# Patient Record
Sex: MALE | Race: WHITE | Employment: UNEMPLOYED | URBAN - METROPOLITAN AREA
[De-identification: names, ages, dates, MRNs, and addresses within clinical notes are randomized per-mention and may not be internally consistent; named-entity substitution may affect disease eponyms.]

---

## 2021-03-04 ENCOUNTER — APPOINTMENT (OUTPATIENT)
Dept: GENERAL RADIOLOGY | Age: 61
DRG: 190 | End: 2021-03-04
Payer: COMMERCIAL

## 2021-03-04 ENCOUNTER — HOSPITAL ENCOUNTER (INPATIENT)
Age: 61
LOS: 1 days | Discharge: ANOTHER HOSPITAL | DRG: 190 | End: 2021-03-05
Attending: EMERGENCY MEDICINE | Admitting: INTERNAL MEDICINE
Payer: COMMERCIAL

## 2021-03-04 DIAGNOSIS — I21.3 ST ELEVATION MYOCARDIAL INFARCTION (STEMI), UNSPECIFIED ARTERY (HCC): Primary | ICD-10-CM

## 2021-03-04 PROBLEM — I50.21 ACUTE SYSTOLIC CHF (CONGESTIVE HEART FAILURE), NYHA CLASS 4 (HCC): Status: ACTIVE | Noted: 2021-03-04

## 2021-03-04 PROBLEM — I50.23 ACUTE ON CHRONIC SYSTOLIC CHF (CONGESTIVE HEART FAILURE), NYHA CLASS 4 (HCC): Status: ACTIVE | Noted: 2021-03-04

## 2021-03-04 LAB
A/G RATIO: 1.3 (ref 1.1–2.2)
ALBUMIN SERPL-MCNC: 4.7 G/DL (ref 3.4–5)
ALP BLD-CCNC: 106 U/L (ref 40–129)
ALT SERPL-CCNC: 28 U/L (ref 10–40)
ANION GAP SERPL CALCULATED.3IONS-SCNC: 16 MMOL/L (ref 3–16)
APTT: 93.5 SEC (ref 24.2–36.2)
AST SERPL-CCNC: 38 U/L (ref 15–37)
BASE EXCESS ARTERIAL: -4 (ref -3–3)
BASE EXCESS VENOUS: -9.4 MMOL/L (ref -3–3)
BASOPHILS ABSOLUTE: 0.2 K/UL (ref 0–0.2)
BASOPHILS RELATIVE PERCENT: 1 %
BILIRUB SERPL-MCNC: 0.9 MG/DL (ref 0–1)
BILIRUBIN URINE: NEGATIVE
BLOOD, URINE: NEGATIVE
BUN BLDV-MCNC: 10 MG/DL (ref 7–20)
CALCIUM SERPL-MCNC: 9.2 MG/DL (ref 8.3–10.6)
CARBOXYHEMOGLOBIN: 2.1 % (ref 0–1.5)
CHLORIDE BLD-SCNC: 96 MMOL/L (ref 99–110)
CLARITY: CLEAR
CO2: 19 MMOL/L (ref 21–32)
COLOR: YELLOW
CREAT SERPL-MCNC: 1.1 MG/DL (ref 0.8–1.3)
D DIMER: 221 NG/ML DDU (ref 0–229)
EOSINOPHILS ABSOLUTE: 0.6 K/UL (ref 0–0.6)
EOSINOPHILS RELATIVE PERCENT: 4 %
EPITHELIAL CELLS, UA: 1 /HPF (ref 0–5)
GFR AFRICAN AMERICAN: >60
GFR NON-AFRICAN AMERICAN: >60
GLOBULIN: 3.6 G/DL
GLUCOSE BLD-MCNC: 157 MG/DL (ref 70–99)
GLUCOSE BLD-MCNC: 211 MG/DL (ref 70–99)
GLUCOSE URINE: NEGATIVE MG/DL
HCO3 ARTERIAL: 27.7 MMOL/L (ref 21–29)
HCO3 VENOUS: 22.7 MMOL/L (ref 23–29)
HCT VFR BLD CALC: 48.6 % (ref 40.5–52.5)
HCT VFR BLD CALC: 49.7 % (ref 40.5–52.5)
HEMOGLOBIN, VEN, REDUCED: 13 %
HEMOGLOBIN: 16 G/DL (ref 13.5–17.5)
HEMOGLOBIN: 16.7 G/DL (ref 13.5–17.5)
HEMOGLOBIN: 18 GM/DL (ref 13.5–17.5)
HYALINE CASTS: 4 /LPF (ref 0–8)
INR BLD: 1.07 (ref 0.86–1.14)
KETONES, URINE: NEGATIVE MG/DL
LACTIC ACID: 4.6 MMOL/L (ref 0.4–2)
LEFT VENTRICULAR EJECTION FRACTION MODE: NORMAL
LEUKOCYTE ESTERASE, URINE: ABNORMAL
LV EF: 30 %
LYMPHOCYTES ABSOLUTE: 5.6 K/UL (ref 1–5.1)
LYMPHOCYTES RELATIVE PERCENT: 36 %
MACROCYTES: ABNORMAL
MCH RBC QN AUTO: 32.9 PG (ref 26–34)
MCH RBC QN AUTO: 33.6 PG (ref 26–34)
MCHC RBC AUTO-ENTMCNC: 32.9 G/DL (ref 31–36)
MCHC RBC AUTO-ENTMCNC: 33.5 G/DL (ref 31–36)
MCV RBC AUTO: 100 FL (ref 80–100)
MCV RBC AUTO: 100.2 FL (ref 80–100)
METHEMOGLOBIN VENOUS: 0.2 %
MICROSCOPIC EXAMINATION: YES
MONOCYTES ABSOLUTE: 1.6 K/UL (ref 0–1.3)
MONOCYTES RELATIVE PERCENT: 10 %
NEUTROPHILS ABSOLUTE: 7.6 K/UL (ref 1.7–7.7)
NEUTROPHILS RELATIVE PERCENT: 49 %
NITRITE, URINE: NEGATIVE
O2 CONTENT, VEN: 21 VOL %
O2 SAT, ARTERIAL: 96 % (ref 93–100)
O2 SAT, VEN: 87 %
O2 THERAPY: ABNORMAL
PCO2 ARTERIAL: 102.8 MM HG (ref 35–45)
PCO2, VEN: 76.1 MMHG (ref 40–50)
PDW BLD-RTO: 13.6 % (ref 12.4–15.4)
PDW BLD-RTO: 13.8 % (ref 12.4–15.4)
PERFORMED ON: ABNORMAL
PH ARTERIAL: 7.03 (ref 7.35–7.45)
PH UA: 5 (ref 5–8)
PH VENOUS: 7.08 (ref 7.35–7.45)
PLATELET # BLD: 225 K/UL (ref 135–450)
PLATELET # BLD: 225 K/UL (ref 135–450)
PLATELET SLIDE REVIEW: ADEQUATE
PMV BLD AUTO: 8.5 FL (ref 5–10.5)
PMV BLD AUTO: 8.5 FL (ref 5–10.5)
PO2 ARTERIAL: 111.3 MM HG (ref 75–108)
PO2, VEN: 72.3 MMHG (ref 25–40)
POC ACT LR: 261 SEC
POC HEMATOCRIT: 53 % (ref 40.5–52.5)
POC PATIENT TEMP: 95
POC SAMPLE TYPE: ABNORMAL
POLYCHROMASIA: ABNORMAL
POTASSIUM REFLEX MAGNESIUM: 3.7 MMOL/L (ref 3.5–5.1)
PRO-BNP: 3506 PG/ML (ref 0–124)
PROTEIN UA: 30 MG/DL
PROTHROMBIN TIME: 12.4 SEC (ref 10–13.2)
RBC # BLD: 4.86 M/UL (ref 4.2–5.9)
RBC # BLD: 4.96 M/UL (ref 4.2–5.9)
RBC UA: 1 /HPF (ref 0–4)
SLIDE REVIEW: ABNORMAL
SODIUM BLD-SCNC: 131 MMOL/L (ref 136–145)
SPECIFIC GRAVITY UA: 1.01 (ref 1–1.03)
TCO2 ARTERIAL: 31 MMOL/L
TCO2 CALC VENOUS: 56 MMOL/L
TOTAL PROTEIN: 8.3 G/DL (ref 6.4–8.2)
TROPONIN: 0.05 NG/ML
URINE REFLEX TO CULTURE: ABNORMAL
URINE TYPE: ABNORMAL
UROBILINOGEN, URINE: 0.2 E.U./DL
WBC # BLD: 15.6 K/UL (ref 4–11)
WBC # BLD: 21.2 K/UL (ref 4–11)
WBC UA: 4 /HPF (ref 0–5)

## 2021-03-04 PROCEDURE — C1887 CATHETER, GUIDING: HCPCS

## 2021-03-04 PROCEDURE — 94002 VENT MGMT INPAT INIT DAY: CPT

## 2021-03-04 PROCEDURE — 6360000002 HC RX W HCPCS: Performed by: EMERGENCY MEDICINE

## 2021-03-04 PROCEDURE — C1769 GUIDE WIRE: HCPCS

## 2021-03-04 PROCEDURE — 96374 THER/PROPH/DIAG INJ IV PUSH: CPT

## 2021-03-04 PROCEDURE — 2500000003 HC RX 250 WO HCPCS

## 2021-03-04 PROCEDURE — 99152 MOD SED SAME PHYS/QHP 5/>YRS: CPT | Performed by: INTERNAL MEDICINE

## 2021-03-04 PROCEDURE — 85025 COMPLETE CBC W/AUTO DIFF WBC: CPT

## 2021-03-04 PROCEDURE — 36415 COLL VENOUS BLD VENIPUNCTURE: CPT

## 2021-03-04 PROCEDURE — 83605 ASSAY OF LACTIC ACID: CPT

## 2021-03-04 PROCEDURE — 85014 HEMATOCRIT: CPT

## 2021-03-04 PROCEDURE — 0BH17EZ INSERTION OF ENDOTRACHEAL AIRWAY INTO TRACHEA, VIA NATURAL OR ARTIFICIAL OPENING: ICD-10-PCS | Performed by: INTERNAL MEDICINE

## 2021-03-04 PROCEDURE — 85347 COAGULATION TIME ACTIVATED: CPT

## 2021-03-04 PROCEDURE — 80053 COMPREHEN METABOLIC PANEL: CPT

## 2021-03-04 PROCEDURE — 84484 ASSAY OF TROPONIN QUANT: CPT

## 2021-03-04 PROCEDURE — 2580000003 HC RX 258

## 2021-03-04 PROCEDURE — 93005 ELECTROCARDIOGRAM TRACING: CPT | Performed by: INTERNAL MEDICINE

## 2021-03-04 PROCEDURE — 93458 L HRT ARTERY/VENTRICLE ANGIO: CPT

## 2021-03-04 PROCEDURE — 5A1935Z RESPIRATORY VENTILATION, LESS THAN 24 CONSECUTIVE HOURS: ICD-10-PCS | Performed by: INTERNAL MEDICINE

## 2021-03-04 PROCEDURE — 93458 L HRT ARTERY/VENTRICLE ANGIO: CPT | Performed by: INTERNAL MEDICINE

## 2021-03-04 PROCEDURE — B2111ZZ FLUOROSCOPY OF MULTIPLE CORONARY ARTERIES USING LOW OSMOLAR CONTRAST: ICD-10-PCS | Performed by: INTERNAL MEDICINE

## 2021-03-04 PROCEDURE — 82803 BLOOD GASES ANY COMBINATION: CPT

## 2021-03-04 PROCEDURE — 85730 THROMBOPLASTIN TIME PARTIAL: CPT

## 2021-03-04 PROCEDURE — 85379 FIBRIN DEGRADATION QUANT: CPT

## 2021-03-04 PROCEDURE — 6370000000 HC RX 637 (ALT 250 FOR IP): Performed by: EMERGENCY MEDICINE

## 2021-03-04 PROCEDURE — 6360000002 HC RX W HCPCS

## 2021-03-04 PROCEDURE — 6360000002 HC RX W HCPCS: Performed by: INTERNAL MEDICINE

## 2021-03-04 PROCEDURE — 93005 ELECTROCARDIOGRAM TRACING: CPT | Performed by: EMERGENCY MEDICINE

## 2021-03-04 PROCEDURE — 2709999900 HC NON-CHARGEABLE SUPPLY

## 2021-03-04 PROCEDURE — 86900 BLOOD TYPING SEROLOGIC ABO: CPT

## 2021-03-04 PROCEDURE — 83880 ASSAY OF NATRIURETIC PEPTIDE: CPT

## 2021-03-04 PROCEDURE — 71045 X-RAY EXAM CHEST 1 VIEW: CPT

## 2021-03-04 PROCEDURE — 85027 COMPLETE CBC AUTOMATED: CPT

## 2021-03-04 PROCEDURE — 94660 CPAP INITIATION&MGMT: CPT

## 2021-03-04 PROCEDURE — 2700000000 HC OXYGEN THERAPY PER DAY

## 2021-03-04 PROCEDURE — 94761 N-INVAS EAR/PLS OXIMETRY MLT: CPT

## 2021-03-04 PROCEDURE — 99254 IP/OBS CNSLTJ NEW/EST MOD 60: CPT | Performed by: INTERNAL MEDICINE

## 2021-03-04 PROCEDURE — 86901 BLOOD TYPING SEROLOGIC RH(D): CPT

## 2021-03-04 PROCEDURE — 82947 ASSAY GLUCOSE BLOOD QUANT: CPT

## 2021-03-04 PROCEDURE — 81001 URINALYSIS AUTO W/SCOPE: CPT

## 2021-03-04 PROCEDURE — 6360000004 HC RX CONTRAST MEDICATION: Performed by: INTERNAL MEDICINE

## 2021-03-04 PROCEDURE — 99153 MOD SED SAME PHYS/QHP EA: CPT

## 2021-03-04 PROCEDURE — 2000000000 HC ICU R&B

## 2021-03-04 PROCEDURE — B2151ZZ FLUOROSCOPY OF LEFT HEART USING LOW OSMOLAR CONTRAST: ICD-10-PCS | Performed by: INTERNAL MEDICINE

## 2021-03-04 PROCEDURE — 99284 EMERGENCY DEPT VISIT MOD MDM: CPT

## 2021-03-04 PROCEDURE — 99152 MOD SED SAME PHYS/QHP 5/>YRS: CPT

## 2021-03-04 PROCEDURE — C1894 INTRO/SHEATH, NON-LASER: HCPCS

## 2021-03-04 PROCEDURE — 2580000003 HC RX 258: Performed by: INTERNAL MEDICINE

## 2021-03-04 PROCEDURE — 4A023N7 MEASUREMENT OF CARDIAC SAMPLING AND PRESSURE, LEFT HEART, PERCUTANEOUS APPROACH: ICD-10-PCS | Performed by: INTERNAL MEDICINE

## 2021-03-04 PROCEDURE — 85610 PROTHROMBIN TIME: CPT

## 2021-03-04 PROCEDURE — 86850 RBC ANTIBODY SCREEN: CPT

## 2021-03-04 RX ORDER — HEPARIN SODIUM 1000 [USP'U]/ML
24.5 INJECTION, SOLUTION INTRAVENOUS; SUBCUTANEOUS PRN
Status: DISCONTINUED | OUTPATIENT
Start: 2021-03-04 | End: 2021-03-05 | Stop reason: HOSPADM

## 2021-03-04 RX ORDER — FUROSEMIDE 10 MG/ML
80 INJECTION INTRAMUSCULAR; INTRAVENOUS ONCE
Status: COMPLETED | OUTPATIENT
Start: 2021-03-04 | End: 2021-03-04

## 2021-03-04 RX ORDER — PROPOFOL 10 MG/ML
10 INJECTION, EMULSION INTRAVENOUS
Status: DISCONTINUED | OUTPATIENT
Start: 2021-03-04 | End: 2021-03-05 | Stop reason: HOSPADM

## 2021-03-04 RX ORDER — ASPIRIN 81 MG/1
324 TABLET, CHEWABLE ORAL ONCE
Status: COMPLETED | OUTPATIENT
Start: 2021-03-04 | End: 2021-03-04

## 2021-03-04 RX ORDER — NITROGLYCERIN 0.4 MG/1
0.4 TABLET SUBLINGUAL EVERY 5 MIN PRN
Status: DISCONTINUED | OUTPATIENT
Start: 2021-03-04 | End: 2021-03-04

## 2021-03-04 RX ORDER — HEPARIN SODIUM 10000 [USP'U]/100ML
INJECTION, SOLUTION INTRAVENOUS
Status: COMPLETED
Start: 2021-03-04 | End: 2021-03-04

## 2021-03-04 RX ORDER — HEPARIN SODIUM 10000 [USP'U]/100ML
12 INJECTION, SOLUTION INTRAVENOUS CONTINUOUS
Status: DISCONTINUED | OUTPATIENT
Start: 2021-03-04 | End: 2021-03-05 | Stop reason: HOSPADM

## 2021-03-04 RX ORDER — HEPARIN SODIUM 1000 [USP'U]/ML
49 INJECTION, SOLUTION INTRAVENOUS; SUBCUTANEOUS ONCE
Status: COMPLETED | OUTPATIENT
Start: 2021-03-04 | End: 2021-03-04

## 2021-03-04 RX ORDER — HEPARIN SODIUM 1000 [USP'U]/ML
49 INJECTION, SOLUTION INTRAVENOUS; SUBCUTANEOUS PRN
Status: DISCONTINUED | OUTPATIENT
Start: 2021-03-04 | End: 2021-03-05 | Stop reason: HOSPADM

## 2021-03-04 RX ORDER — PROPOFOL 10 MG/ML
INJECTION, EMULSION INTRAVENOUS
Status: DISPENSED
Start: 2021-03-04 | End: 2021-03-05

## 2021-03-04 RX ORDER — SODIUM CHLORIDE 9 MG/ML
INJECTION, SOLUTION INTRAVENOUS
Status: DISPENSED
Start: 2021-03-04 | End: 2021-03-05

## 2021-03-04 RX ADMIN — FUROSEMIDE 80 MG: 10 INJECTION, SOLUTION INTRAMUSCULAR; INTRAVENOUS at 22:45

## 2021-03-04 RX ADMIN — HEPARIN SODIUM 4000 UNITS: 1000 INJECTION, SOLUTION INTRAVENOUS; SUBCUTANEOUS at 20:27

## 2021-03-04 RX ADMIN — ASPIRIN 81 MG 324 MG: 81 TABLET ORAL at 20:09

## 2021-03-04 RX ADMIN — TICAGRELOR 180 MG: 90 TABLET ORAL at 20:28

## 2021-03-04 RX ADMIN — HEPARIN SODIUM 12 UNITS/KG/HR: 10000 INJECTION, SOLUTION INTRAVENOUS at 23:02

## 2021-03-04 RX ADMIN — IOPAMIDOL 87 ML: 755 INJECTION, SOLUTION INTRAVENOUS at 21:32

## 2021-03-04 RX ADMIN — PROPOFOL 30 MCG/KG/MIN: 10 INJECTION, EMULSION INTRAVENOUS at 23:10

## 2021-03-04 RX ADMIN — HEPARIN SODIUM 12 UNITS/KG/HR: 10000 INJECTION, SOLUTION INTRAVENOUS at 20:23

## 2021-03-04 RX ADMIN — FUROSEMIDE 10 MG/HR: 10 INJECTION, SOLUTION INTRAMUSCULAR; INTRAVENOUS at 23:02

## 2021-03-04 ASSESSMENT — PULMONARY FUNCTION TESTS
PIF_VALUE: 28
PIF_VALUE: 28

## 2021-03-05 VITALS
RESPIRATION RATE: 24 BRPM | DIASTOLIC BLOOD PRESSURE: 90 MMHG | WEIGHT: 177.25 LBS | SYSTOLIC BLOOD PRESSURE: 136 MMHG | HEIGHT: 70 IN | HEART RATE: 87 BPM | BODY MASS INDEX: 25.38 KG/M2 | TEMPERATURE: 100.8 F | OXYGEN SATURATION: 99 %

## 2021-03-05 LAB
ABO/RH: NORMAL
ANION GAP SERPL CALCULATED.3IONS-SCNC: 11 MMOL/L (ref 3–16)
ANION GAP SERPL CALCULATED.3IONS-SCNC: 12 MMOL/L (ref 3–16)
ANION GAP SERPL CALCULATED.3IONS-SCNC: 13 MMOL/L (ref 3–16)
ANTIBODY SCREEN: NORMAL
APTT: 53.7 SEC (ref 24.2–36.2)
APTT: 59.5 SEC (ref 24.2–36.2)
BASE EXCESS ARTERIAL: -3 (ref -3–3)
BASE EXCESS ARTERIAL: -6.3 MMOL/L (ref -3–3)
BUN BLDV-MCNC: 13 MG/DL (ref 7–20)
BUN BLDV-MCNC: 17 MG/DL (ref 7–20)
BUN BLDV-MCNC: 18 MG/DL (ref 7–20)
CALCIUM SERPL-MCNC: 8.1 MG/DL (ref 8.3–10.6)
CALCIUM SERPL-MCNC: 8.5 MG/DL (ref 8.3–10.6)
CALCIUM SERPL-MCNC: 8.8 MG/DL (ref 8.3–10.6)
CARBOXYHEMOGLOBIN ARTERIAL: 0.3 % (ref 0–1.5)
CHLORIDE BLD-SCNC: 95 MMOL/L (ref 99–110)
CHLORIDE BLD-SCNC: 96 MMOL/L (ref 99–110)
CHLORIDE BLD-SCNC: 96 MMOL/L (ref 99–110)
CO2: 18 MMOL/L (ref 21–32)
CO2: 19 MMOL/L (ref 21–32)
CO2: 23 MMOL/L (ref 21–32)
CREAT SERPL-MCNC: 1.3 MG/DL (ref 0.8–1.3)
CREAT SERPL-MCNC: 1.8 MG/DL (ref 0.8–1.3)
CREAT SERPL-MCNC: 1.8 MG/DL (ref 0.8–1.3)
EKG ATRIAL RATE: 118 BPM
EKG ATRIAL RATE: 125 BPM
EKG ATRIAL RATE: 129 BPM
EKG DIAGNOSIS: NORMAL
EKG P AXIS: 69 DEGREES
EKG P AXIS: 75 DEGREES
EKG P AXIS: 77 DEGREES
EKG P-R INTERVAL: 136 MS
EKG P-R INTERVAL: 160 MS
EKG P-R INTERVAL: 164 MS
EKG Q-T INTERVAL: 286 MS
EKG Q-T INTERVAL: 292 MS
EKG Q-T INTERVAL: 294 MS
EKG QRS DURATION: 86 MS
EKG QRS DURATION: 90 MS
EKG QRS DURATION: 94 MS
EKG QTC CALCULATION (BAZETT): 412 MS
EKG QTC CALCULATION (BAZETT): 418 MS
EKG QTC CALCULATION (BAZETT): 421 MS
EKG R AXIS: 101 DEGREES
EKG R AXIS: 96 DEGREES
EKG R AXIS: 98 DEGREES
EKG T AXIS: -19 DEGREES
EKG T AXIS: -50 DEGREES
EKG T AXIS: -67 DEGREES
EKG VENTRICULAR RATE: 118 BPM
EKG VENTRICULAR RATE: 125 BPM
EKG VENTRICULAR RATE: 129 BPM
GFR AFRICAN AMERICAN: 47
GFR AFRICAN AMERICAN: 47
GFR AFRICAN AMERICAN: >60
GFR NON-AFRICAN AMERICAN: 38
GFR NON-AFRICAN AMERICAN: 38
GFR NON-AFRICAN AMERICAN: 56
GLUCOSE BLD-MCNC: 141 MG/DL (ref 70–99)
GLUCOSE BLD-MCNC: 168 MG/DL (ref 70–99)
GLUCOSE BLD-MCNC: 175 MG/DL (ref 70–99)
HCO3 ARTERIAL: 19.6 MMOL/L (ref 21–29)
HCO3 ARTERIAL: 27.5 MMOL/L (ref 21–29)
HCT VFR BLD CALC: 47.4 % (ref 40.5–52.5)
HCT VFR BLD CALC: 48.8 % (ref 40.5–52.5)
HEMATOLOGY PATH CONSULT: NORMAL
HEMOGLOBIN, ART, EXTENDED: 16.3 G/DL (ref 13.5–17.5)
HEMOGLOBIN: 15.8 G/DL (ref 13.5–17.5)
HEMOGLOBIN: 16 G/DL (ref 13.5–17.5)
LV EF: 15 %
LVEF MODALITY: NORMAL
MAGNESIUM: 2.3 MG/DL (ref 1.8–2.4)
MCH RBC QN AUTO: 32.7 PG (ref 26–34)
MCH RBC QN AUTO: 32.9 PG (ref 26–34)
MCHC RBC AUTO-ENTMCNC: 32.7 G/DL (ref 31–36)
MCHC RBC AUTO-ENTMCNC: 33.2 G/DL (ref 31–36)
MCV RBC AUTO: 99.1 FL (ref 80–100)
MCV RBC AUTO: 99.9 FL (ref 80–100)
METHEMOGLOBIN ARTERIAL: 0.4 %
O2 CONTENT ARTERIAL: 23 ML/DL
O2 SAT, ARTERIAL: 99 % (ref 93–100)
O2 SAT, ARTERIAL: 99.9 %
O2 THERAPY: ABNORMAL
PCO2 ARTERIAL: 105.9 MM HG (ref 35–45)
PCO2 ARTERIAL: 39.4 MMHG (ref 35–45)
PDW BLD-RTO: 13.6 % (ref 12.4–15.4)
PDW BLD-RTO: 13.7 % (ref 12.4–15.4)
PERFORMED ON: ABNORMAL
PERFORMED ON: NORMAL
PH ARTERIAL: 7.02 (ref 7.35–7.45)
PH ARTERIAL: 7.31 (ref 7.35–7.45)
PLATELET # BLD: 221 K/UL (ref 135–450)
PLATELET # BLD: 222 K/UL (ref 135–450)
PMV BLD AUTO: 8.3 FL (ref 5–10.5)
PMV BLD AUTO: 8.4 FL (ref 5–10.5)
PO2 ARTERIAL: 230.4 MM HG (ref 75–108)
PO2 ARTERIAL: 305 MMHG (ref 75–108)
POC SAMPLE TYPE: ABNORMAL
POC SAMPLE TYPE: NORMAL
POTASSIUM REFLEX MAGNESIUM: 3.8 MMOL/L (ref 3.5–5.1)
POTASSIUM SERPL-SCNC: 5.2 MMOL/L (ref 3.5–5.1)
POTASSIUM SERPL-SCNC: 6.3 MMOL/L (ref 3.5–5.1)
POTASSIUM SERPL-SCNC: 6.5 MMOL/L (ref 3.5–5.1)
PROCALCITONIN: 3.65 NG/ML (ref 0–0.15)
RBC # BLD: 4.79 M/UL (ref 4.2–5.9)
RBC # BLD: 4.89 M/UL (ref 4.2–5.9)
SARS-COV-2, NAAT: NOT DETECTED
SODIUM BLD-SCNC: 126 MMOL/L (ref 136–145)
SODIUM BLD-SCNC: 127 MMOL/L (ref 136–145)
SODIUM BLD-SCNC: 130 MMOL/L (ref 136–145)
TCO2 ARTERIAL: 31 MMOL/L
TCO2 ARTERIAL: 46.7 MMOL/L
WBC # BLD: 17.2 K/UL (ref 4–11)
WBC # BLD: 21.2 K/UL (ref 4–11)

## 2021-03-05 PROCEDURE — 85027 COMPLETE CBC AUTOMATED: CPT

## 2021-03-05 PROCEDURE — 37799 UNLISTED PX VASCULAR SURGERY: CPT

## 2021-03-05 PROCEDURE — 83735 ASSAY OF MAGNESIUM: CPT

## 2021-03-05 PROCEDURE — 87635 SARS-COV-2 COVID-19 AMP PRB: CPT

## 2021-03-05 PROCEDURE — 94003 VENT MGMT INPAT SUBQ DAY: CPT

## 2021-03-05 PROCEDURE — 36415 COLL VENOUS BLD VENIPUNCTURE: CPT

## 2021-03-05 PROCEDURE — 82803 BLOOD GASES ANY COMBINATION: CPT

## 2021-03-05 PROCEDURE — 87641 MR-STAPH DNA AMP PROBE: CPT

## 2021-03-05 PROCEDURE — 94640 AIRWAY INHALATION TREATMENT: CPT

## 2021-03-05 PROCEDURE — 2500000003 HC RX 250 WO HCPCS: Performed by: STUDENT IN AN ORGANIZED HEALTH CARE EDUCATION/TRAINING PROGRAM

## 2021-03-05 PROCEDURE — 99291 CRITICAL CARE FIRST HOUR: CPT | Performed by: INTERNAL MEDICINE

## 2021-03-05 PROCEDURE — 2580000003 HC RX 258: Performed by: STUDENT IN AN ORGANIZED HEALTH CARE EDUCATION/TRAINING PROGRAM

## 2021-03-05 PROCEDURE — 6360000002 HC RX W HCPCS: Performed by: INTERNAL MEDICINE

## 2021-03-05 PROCEDURE — 93010 ELECTROCARDIOGRAM REPORT: CPT | Performed by: INTERNAL MEDICINE

## 2021-03-05 PROCEDURE — 80048 BASIC METABOLIC PNL TOTAL CA: CPT

## 2021-03-05 PROCEDURE — 93306 TTE W/DOPPLER COMPLETE: CPT

## 2021-03-05 PROCEDURE — 2700000000 HC OXYGEN THERAPY PER DAY

## 2021-03-05 PROCEDURE — 84145 PROCALCITONIN (PCT): CPT

## 2021-03-05 PROCEDURE — 6370000000 HC RX 637 (ALT 250 FOR IP): Performed by: INTERNAL MEDICINE

## 2021-03-05 PROCEDURE — 84132 ASSAY OF SERUM POTASSIUM: CPT

## 2021-03-05 PROCEDURE — 2580000003 HC RX 258: Performed by: INTERNAL MEDICINE

## 2021-03-05 PROCEDURE — 85730 THROMBOPLASTIN TIME PARTIAL: CPT

## 2021-03-05 PROCEDURE — 94761 N-INVAS EAR/PLS OXIMETRY MLT: CPT

## 2021-03-05 PROCEDURE — 83036 HEMOGLOBIN GLYCOSYLATED A1C: CPT

## 2021-03-05 PROCEDURE — 6370000000 HC RX 637 (ALT 250 FOR IP): Performed by: STUDENT IN AN ORGANIZED HEALTH CARE EDUCATION/TRAINING PROGRAM

## 2021-03-05 PROCEDURE — C9113 INJ PANTOPRAZOLE SODIUM, VIA: HCPCS | Performed by: INTERNAL MEDICINE

## 2021-03-05 PROCEDURE — 2500000003 HC RX 250 WO HCPCS: Performed by: INTERNAL MEDICINE

## 2021-03-05 RX ORDER — DEXTROSE MONOHYDRATE 50 MG/ML
100 INJECTION, SOLUTION INTRAVENOUS PRN
Status: DISCONTINUED | OUTPATIENT
Start: 2021-03-05 | End: 2021-03-05 | Stop reason: HOSPADM

## 2021-03-05 RX ORDER — POTASSIUM CHLORIDE 7.45 MG/ML
10 INJECTION INTRAVENOUS PRN
Status: DISCONTINUED | OUTPATIENT
Start: 2021-03-05 | End: 2021-03-05 | Stop reason: HOSPADM

## 2021-03-05 RX ORDER — ROCURONIUM BROMIDE 10 MG/ML
50 INJECTION, SOLUTION INTRAVENOUS ONCE
Status: DISCONTINUED | OUTPATIENT
Start: 2021-03-05 | End: 2021-03-05 | Stop reason: HOSPADM

## 2021-03-05 RX ORDER — ACETAMINOPHEN 325 MG/1
650 TABLET ORAL EVERY 4 HOURS PRN
Status: DISCONTINUED | OUTPATIENT
Start: 2021-03-05 | End: 2021-03-05 | Stop reason: HOSPADM

## 2021-03-05 RX ORDER — FUROSEMIDE 10 MG/ML
40 INJECTION INTRAMUSCULAR; INTRAVENOUS ONCE
Status: COMPLETED | OUTPATIENT
Start: 2021-03-05 | End: 2021-03-05

## 2021-03-05 RX ORDER — MAGNESIUM SULFATE IN WATER 40 MG/ML
2000 INJECTION, SOLUTION INTRAVENOUS PRN
Status: DISCONTINUED | OUTPATIENT
Start: 2021-03-05 | End: 2021-03-05 | Stop reason: HOSPADM

## 2021-03-05 RX ORDER — ONDANSETRON 2 MG/ML
4 INJECTION INTRAMUSCULAR; INTRAVENOUS EVERY 6 HOURS PRN
Status: DISCONTINUED | OUTPATIENT
Start: 2021-03-05 | End: 2021-03-05 | Stop reason: HOSPADM

## 2021-03-05 RX ORDER — NICOTINE POLACRILEX 4 MG
15 LOZENGE BUCCAL PRN
Status: DISCONTINUED | OUTPATIENT
Start: 2021-03-05 | End: 2021-03-05 | Stop reason: HOSPADM

## 2021-03-05 RX ORDER — ALBUTEROL SULFATE 2.5 MG/3ML
10 SOLUTION RESPIRATORY (INHALATION) ONCE
Status: COMPLETED | OUTPATIENT
Start: 2021-03-05 | End: 2021-03-05

## 2021-03-05 RX ORDER — PROPOFOL 10 MG/ML
50 INJECTION, EMULSION INTRAVENOUS ONCE
Status: DISCONTINUED | OUTPATIENT
Start: 2021-03-05 | End: 2021-03-05 | Stop reason: HOSPADM

## 2021-03-05 RX ORDER — SODIUM POLYSTYRENE SULFONATE 15 G/60ML
15 SUSPENSION ORAL; RECTAL ONCE
Status: DISCONTINUED | OUTPATIENT
Start: 2021-03-05 | End: 2021-03-05

## 2021-03-05 RX ORDER — SODIUM CHLORIDE 0.9 % (FLUSH) 0.9 %
10 SYRINGE (ML) INJECTION PRN
Status: DISCONTINUED | OUTPATIENT
Start: 2021-03-05 | End: 2021-03-05 | Stop reason: HOSPADM

## 2021-03-05 RX ORDER — DEXTROSE MONOHYDRATE 25 G/50ML
12.5 INJECTION, SOLUTION INTRAVENOUS PRN
Status: DISCONTINUED | OUTPATIENT
Start: 2021-03-05 | End: 2021-03-05 | Stop reason: HOSPADM

## 2021-03-05 RX ORDER — PANTOPRAZOLE SODIUM 40 MG/10ML
40 INJECTION, POWDER, LYOPHILIZED, FOR SOLUTION INTRAVENOUS DAILY
Status: DISCONTINUED | OUTPATIENT
Start: 2021-03-05 | End: 2021-03-05 | Stop reason: HOSPADM

## 2021-03-05 RX ORDER — SODIUM CHLORIDE 0.9 % (FLUSH) 0.9 %
10 SYRINGE (ML) INJECTION EVERY 12 HOURS SCHEDULED
Status: DISCONTINUED | OUTPATIENT
Start: 2021-03-05 | End: 2021-03-05 | Stop reason: HOSPADM

## 2021-03-05 RX ORDER — CALCIUM GLUCONATE 20 MG/ML
1000 INJECTION, SOLUTION INTRAVENOUS ONCE
Status: COMPLETED | OUTPATIENT
Start: 2021-03-05 | End: 2021-03-05

## 2021-03-05 RX ORDER — POLYETHYLENE GLYCOL 3350 17 G/17G
17 POWDER, FOR SOLUTION ORAL DAILY PRN
Status: DISCONTINUED | OUTPATIENT
Start: 2021-03-05 | End: 2021-03-05 | Stop reason: HOSPADM

## 2021-03-05 RX ORDER — ETOMIDATE 2 MG/ML
10 INJECTION INTRAVENOUS ONCE
Status: DISCONTINUED | OUTPATIENT
Start: 2021-03-05 | End: 2021-03-05 | Stop reason: HOSPADM

## 2021-03-05 RX ORDER — DEXTROSE MONOHYDRATE 25 G/50ML
25 INJECTION, SOLUTION INTRAVENOUS ONCE
Status: COMPLETED | OUTPATIENT
Start: 2021-03-05 | End: 2021-03-05

## 2021-03-05 RX ADMIN — ACETAMINOPHEN 650 MG: 325 TABLET ORAL at 15:46

## 2021-03-05 RX ADMIN — FUROSEMIDE 10 MG/HR: 10 INJECTION, SOLUTION INTRAMUSCULAR; INTRAVENOUS at 06:54

## 2021-03-05 RX ADMIN — VANCOMYCIN HYDROCHLORIDE 1250 MG: 10 INJECTION, POWDER, LYOPHILIZED, FOR SOLUTION INTRAVENOUS at 05:20

## 2021-03-05 RX ADMIN — PANTOPRAZOLE SODIUM 40 MG: 40 INJECTION, POWDER, FOR SOLUTION INTRAVENOUS at 08:13

## 2021-03-05 RX ADMIN — AZITHROMYCIN MONOHYDRATE 500 MG: 500 INJECTION, POWDER, LYOPHILIZED, FOR SOLUTION INTRAVENOUS at 02:30

## 2021-03-05 RX ADMIN — FUROSEMIDE 40 MG: 10 INJECTION, SOLUTION INTRAMUSCULAR; INTRAVENOUS at 10:45

## 2021-03-05 RX ADMIN — Medication 2 MCG/MIN: at 00:43

## 2021-03-05 RX ADMIN — Medication 10 ML: at 08:13

## 2021-03-05 RX ADMIN — SODIUM ZIRCONIUM CYCLOSILICATE 10 G: 10 POWDER, FOR SUSPENSION ORAL at 10:42

## 2021-03-05 RX ADMIN — PROPOFOL 40 MCG/KG/MIN: 10 INJECTION, EMULSION INTRAVENOUS at 02:28

## 2021-03-05 RX ADMIN — SODIUM ZIRCONIUM CYCLOSILICATE 10 G: 10 POWDER, FOR SUSPENSION ORAL at 14:39

## 2021-03-05 RX ADMIN — PROPOFOL 50 MCG/KG/MIN: 10 INJECTION, EMULSION INTRAVENOUS at 19:05

## 2021-03-05 RX ADMIN — CALCIUM GLUCONATE 1000 MG: 20 INJECTION, SOLUTION INTRAVENOUS at 10:46

## 2021-03-05 RX ADMIN — INSULIN HUMAN 10 UNITS: 100 INJECTION, SOLUTION PARENTERAL at 09:41

## 2021-03-05 RX ADMIN — CEFEPIME HYDROCHLORIDE 2000 MG: 2 INJECTION, POWDER, FOR SOLUTION INTRAVENOUS at 01:43

## 2021-03-05 RX ADMIN — ALBUTEROL SULFATE 10 MG: 2.5 SOLUTION RESPIRATORY (INHALATION) at 10:33

## 2021-03-05 RX ADMIN — DEXTROSE MONOHYDRATE 25 G: 25 INJECTION, SOLUTION INTRAVENOUS at 09:41

## 2021-03-05 RX ADMIN — CEFEPIME HYDROCHLORIDE 2000 MG: 2 INJECTION, POWDER, FOR SOLUTION INTRAVENOUS at 14:14

## 2021-03-05 RX ADMIN — HEPARIN SODIUM 12 UNITS/KG/HR: 10000 INJECTION, SOLUTION INTRAVENOUS at 19:06

## 2021-03-05 RX ADMIN — PROPOFOL 50 MCG/KG/MIN: 10 INJECTION, EMULSION INTRAVENOUS at 14:04

## 2021-03-05 RX ADMIN — PROPOFOL 40 MCG/KG/MIN: 10 INJECTION, EMULSION INTRAVENOUS at 08:55

## 2021-03-05 ASSESSMENT — PULMONARY FUNCTION TESTS
PIF_VALUE: 29
PIF_VALUE: 26
PIF_VALUE: 27
PIF_VALUE: 31

## 2021-03-05 NOTE — DISCHARGE SUMMARY
Hospital Medicine Discharge Summary    Patient ID: Namrata Long      Patient's PCP: Haley Orellana MD    Admit Date: 3/4/2021     Discharge Date:   3/5/2021    Admitting Physician: Jerome Solis MD     Discharge Physician: Abdiel Mace MD     Discharge Diagnoses: Active Hospital Problems    Diagnosis    Acute on chronic systolic CHF (congestive heart failure), NYHA class 4 ([facility]) [I50.23]    Acute respiratory failure with hypoxia ([facility]) [J96.01]       The patient was seen and examined on day of discharge and this discharge summary is in conjunction with any daily progress note from day of discharge. Hospital Course:     Patient is 63years old man with past medical history significant for alcohol abuse, tobacco dependence, who presented to the ER yesterday for shortness of breath, upon arrival to ER he was tachypneic, tachycardic, EKG ruled in for STEMI, angiogram found to have severe multivessel coronary artery disease and ejection fraction of 25 to 30% CVT surgeon was consulted and cardiology. Decision was made to transfer patient heart [facility]. Patient was admitted to ICU intubated, ventilated, on Lasix and heparin gtt., also started on Levophed, and he was given vancomycin and cefepime. Empirically for sepsis. During hospitalization he has been treated for the following medical conditions    Diffuse three-vessel coronary artery disease  Acute hypoxic respiratory failure  Acute pulmonary edema  Ischemic cardiomyopathy  With acute congestive heart failure  Acute renal failure  Hyperkalemia  Peripheral vascular disease    Critical care, cardiology, CVT surgery, and nephrology following  Patient was started on Lasix GTT, and heparin gtt.   Also on pressors, Levophed 40 mics      He was started on vancomycin and cefepime concerning for pneumonia  Elevated procalcitonin  MRSA pending      CVT surgeon spoke with family and cardiology and plan to transfer patient to [facility], at [place].      Physical Exam Performed:     BP (!) 136/90   Pulse 94   Temp 100.2 °F (37.9 °C) (Bladder)   Resp 30   Ht 5' 10\" (1.778 m)   Wt 177 lb 4 oz (80.4 kg)   SpO2 100%   BMI 25.43 kg/m²       General appearance: Intubated  HEENT:  Normal cephalic, atraumatic without obvious deformity. Pupils equal, round, and reactive to light. Extra ocular muscles intact. Conjunctivae/corneas clear. Neck: Supple, with full range of motion. No jugular venous distention. Trachea midline. Respiratory:  Normal respiratory effort. Clear to auscultation, bilaterally without Rales/Wheezes/Rhonchi. Cardiovascular:  Regular rate and rhythm with normal S1/S2 without murmurs, rubs or gallops. Abdomen: Soft, non-tender, non-distended with normal bowel sounds. Musculoskeletal:  No clubbing, cyanosis or edema bilaterally. Full range of motion without deformity. Skin: Skin color, texture, turgor normal.  No rashes or lesions. Neurologic: Intubated and sedated  Psychiatric: Intubated  Capillary Refill: Brisk,< 3 seconds   Peripheral Pulses: +2 palpable, equal bilaterally       Labs: For convenience and continuity at follow-up the following most recent labs are provided:      CBC:    Lab Results   Component Value Date    WBC 17.2 03/05/2021    HGB 15.8 03/05/2021    HCT 47.4 03/05/2021     03/05/2021       Renal:    Lab Results   Component Value Date     03/05/2021    K 5.2 03/05/2021    K 3.8 03/05/2021    CL 96 03/05/2021    CO2 19 03/05/2021    BUN 18 03/05/2021    CREATININE 1.8 03/05/2021    CALCIUM 8.1 03/05/2021         Significant Diagnostic Studies    Radiology:   XR CHEST PORTABLE   Final Result   1. Endotracheal tube tip is 6 cm above the declan. 2. The enteric tube is in good position in the stomach. 3. Worsening pulmonary edema. XR CHEST PORTABLE   Final Result   Increased interstitial markings and hazy opacities bilaterally likely   reflecting interstitial edema. Atypical pneumonia cannot be excluded. Consults:     IP CONSULT TO HOSPITALIST  IP CONSULT TO PULMONOLOGY  IP CONSULT TO HEART FAILURE NURSE/COORDINATOR  IP CONSULT TO DIETITIAN  PHARMACY TO DOSE VANCOMYCIN  IP CONSULT TO PULMONOLOGY  IP CONSULT TO NEPHROLOGY  IP CONSULT TO CARDIOTHORACIC SURGERY    Disposition: Transfer to [facility]    Condition at Discharge: Stable    Discharge Instructions/Follow-up: Follow-up with CVTS, cardiology, critical care    Code Status:  Full Code     Activity: activity as tolerated    NPO      Discharge Medications:     Current Discharge Medication List           Details   ibuprofen (ADVIL;MOTRIN) 800 MG tablet Take 1 tablet by mouth every 6 hours as needed for Pain. Qty: 40 tablet, Refills: 0             Time Spent on discharge is more than 30 minutes in the examination, evaluation, counseling and review of medications and discharge plan. Signed:    Tawanna Barney MD   3/5/2021      Thank you Bro Duron MD for the opportunity to be involved in this patient's care. If you have any questions or concerns please feel free to contact me at [phone].

## 2021-03-05 NOTE — CONSULTS
Patient currently sedated on ventilator. Patient is going to be transferred to [address] [place] [address] in [place]. Not appropriate for education at this time.

## 2021-03-05 NOTE — ED NOTES
Bed: 05  Expected date:   Expected time:   Means of arrival: [facility]  Comments:     James Silva RN  03/04/21 1941

## 2021-03-05 NOTE — CONSULTS
[facility]       Cardiac Catheterization Lab Report    PATIENT: Lois Rangel  DATE: 3/4/2021  MRN: [id number]  CSN: [id number]  : 1960      Performing Physician: Sridhar Melgar MD, [place], [facility]  Primary Care Physician: Iwona Beckford MD  Admitting/Referring provider: No admitting provider for patient encounter. Procedures Performed:   1. Left heart catheterization  2. Selective left and right coronary angiogram  3. Left ventriculography     Procedure Findings:  1. Severe multi vessel coronary artery diease  2. Severely reduced LVEF <30%  3. Severely elevated LVEDP 44    Indications: There is no problem list on file for this patient. Abnormal EKG  Acute respiratory failure    Details:   Lois Rangel was brought to the cardiac catheterization lab in a fasting state after informed consent was obtained. If the patient was able to provide written consent, it was obtained. The patient's vitals were monitored through out the procedure. The patient was sedated using the appropriate levels of sedation and ASA guidelines. The appropriate right femoral access site area was prepped and drapped in a sterile fashion. The area was anesthetized with 2% lidocaine. Using the modified Seldinger technique, an arterial sheath was introduced into the arterial access site using a single anterior wall puncture. The sheath was flushed and prepped in usual fashion. Catheters used during the procedure included 5 Irish JL4., JR4, and pigtail catheters. The catheters were advanced and removed over a .035\" wire, into the appropriate positions. Multiple angiographic views were obtained. An LV gram was obtained. Findings:    1. Left main coronary artery was normal. It gave off the left anterior descending artery and left circumflex.

## 2021-03-05 NOTE — PLAN OF CARE
Nutrition Problem #1: Inadequate energy intake  Intervention: Food and/or Nutrient Delivery: Continue NPO  Nutritional Goals: Initiate TF once pt hemodynamically stable

## 2021-03-05 NOTE — CONSULTS
[address] [facility]   Cardiovascular Evaluation    PATIENT: Lucilla Bernheim  DATE: 3/4/2021  MRN: [id number]  CSN: [id number]  : 1960    Primary Care Doctor/Referring provider: Ophelia Sorenson MD, Ford Rome MD     Reason for evaluation/Chief complaint:   Shortness of Breath (Pt presented to the ED from the [address] [place] [address] after he became short of breath after refinishing his bathtub. Pt was found to be at 83% on RA. Pt currently trippoding in strecther bed. )      Subjective:    History of present illness on initial date of evaluation:   Lucilla Bernheim is a 63 y.o. patient who presents with acute onset of SOB. The patient was seen in the ER. He underwent an EKG which showed possible STEMI. The patient was clinically concerned for ACS and interventional cardiology was consulted. The patient is not able to give detailed information about the events of hospitalization due to their underlying medical illness. The majority of the information is taken from the chart, medical staff, and available family. The patient was taken to the cath lab for these concerns. Patient Active Problem List   Diagnosis    Acute systolic CHF (congestive heart failure), NYHA class 4 ([facility] [address]         Cardiac Testing: I have reviewed the findings below. EKG:  ECHO:   STRESS TEST:  CATH:  BYPASS:  VASCULAR:    Past Medical History:   has no past medical history on file. Surgical History:   has a past surgical history that includes hernia repair. Social History:   reports that he quit smoking about 10 years ago. He does not have any smokeless tobacco history on file. He reports current alcohol use. He reports that he does not use drugs. Family History:  No evidence for sudden cardiac death or premature CAD    Medications:  Reviewed and are listed in nursing record. and/or listed below  Outpatient Medications:  Prior to Admission medications    Medication Sig Start Date End Date Taking?  Authorizing Provider   ibuprofen (ADVIL;MOTRIN) 800 MG tablet Take 1 tablet by mouth every 6 hours as needed for Pain. 1/9/15   Berto Christopher PA-C       In-patient schedule medications:        Infusion Medications:   heparin (PORCINE) Infusion 12 Units/kg/hr (03/04/21 2023)         Allergies:  Patient has no known allergies. Review of Systems:   14 point review of systems unable to be completed due to patient condition/cooperation.  All available positives mentioned in history of present illness. ;e      Physical Examination:    [unfilled]  BP (!) 143/84   Pulse 124   Resp 28   Wt 180 lb (81.6 kg)   SpO2 91%   BMI 25.46 kg/m²    Weight: 180 lb (81.6 kg)     Wt Readings from Last 3 Encounters:   03/04/21 180 lb (81.6 kg)     No intake or output data in the 24 hours ending 03/04/21 2208    General Appearance:  Severe distress, appears stated age   Head:  Normocephalic, without obvious abnormality, atraumatic   Eyes:  PERRL, conjunctiva/corneas clear       Nose: Nares normal, no drainage or sinus tenderness   Throat: Lips, mucosa, and tongue normal   Neck: Supple, symmetrical, trachea midline, no adenopathy, thyroid: not enlarged, symmetric, no tenderness/mass/nodules, no carotid bruit or JVD       Lungs:   reduced to auscultation bilaterally, respirations labored   Chest Wall:  No tenderness or deformity   Heart:  Regular rhythm and fast rate; S1, S2 are normal; no murmur noted; no rub or gallop   Abdomen:   Soft, non-tender, bowel sounds active all four quadrants,  no masses, no organomegaly           Extremities: Extremities normal, atraumatic, no cyanosis or edema   Pulses: 2+ and symmetric   Skin: Skin color, texture, turgor normal, no rashes or lesions   Pysch: unresponsive   Neurologic: unresponsive         Labs  Recent Labs     03/04/21  1650   WBC 15.6*   HGB 16.7   HCT 49.7   .2*        Recent Labs     03/04/21  1650   CREATININE 1.1   BUN 10   *   K 3.7   CL 96*   CO2 19*     Recent Labs 03/04/21  1650   INR 1.07   PROTIME 12.4     Recent Labs     03/04/21  1650   TROPONINI 0.05*     Invalid input(s): PRO-BNP  No results for input(s): CHOL, HDL in the last 72 hours. Invalid input(s): LDL, TG      Imaging:  I have reviewed the below testing personally and my interpretation is below. EKG: sinus tachycardia with anterior ST changes. ST depression V6. CXR:      Assessment:  63 y.o. patient with:  Active Problems:    Acute systolic CHF (congestive heart failure), NYHA class 4 (HCC)  Resolved Problems:    * No resolved hospital problems. *    Plan:  1. I had the opportunity to review the Brent Pinto clinical presentation and the available pertinent data. With the patient's clinical risk factors and symptoms, there is a high pretest likelihood for CAD. I have asked Brent Pinto to undergo cardiac angiography for further diagnostic testing. The procedure was explained in depth. All questions and alternate treatment strategies were discussed. The patient agrees to proceed. They understand all the risks associated with the procedure, including myocardial infarction, stroke, death, vascular complications, and the possible need for emergent surgery. 2. Keep NPO   3. Pre-cath orders will be placed. All questions and concerns were addressed to the patient/family. Alternatives to my treatment were discussed. The note was completed using EMR. Every effort was made to ensure accuracy; however, inadvertent computerized transcription errors may be present.     Omar Guo MD, [address], [place], [place]  [phone] [facility] office  [phone] [facility]  3/4/2021  10:08 PM

## 2021-03-05 NOTE — PROGRESS NOTES
Renal consult received    Notes and labs reviewed  Orders placed  Discussed with nurse  Full note to follow    High risk.    Thank you

## 2021-03-05 NOTE — PROGRESS NOTES
Physician intubated pt. With 7.5 ET and secured at 25 cm @ lip.  bilat breath sounds ausculated. Pt placed on vent at physician ordered settings. Pt. Then transferred to CVU. RN bagged pt. On 100% then pt. Placed back on vent at current settings.

## 2021-03-05 NOTE — ED PROVIDER NOTES
[address] CVU PROVIDER NOTE    Patient Identification  Pt Name: Jerson Herron  MRN: [id number]  Bret 1960  Date of evaluation: 3/4/2021  Provider: Louise Santoro MD  PCP: Florencio Addison MD    Chief Complaint  dyspnea    HPI  History provided by patient   This is a 63 y.o. male who presents to the ED for shortness of breath. Started about 1-1/2 hours prior to arrival while patient was refinishing his bathroom. No chest pain or chest tightness. Shortness of breath is severe. Does not follow with a doctor. Never had this before. No fever or cough. Does have history of smoking. Symptoms are severe. [employer] ROS  10 systems reviewed, pertinent positives/negatives per HPI otherwise noted to be negative. I have reviewed the following nursing documentation:  Allergies: Patient has no known allergies. Past medical history: No past medical history on file. Past surgical history:   Past Surgical History:   Procedure Laterality Date    HERNIA REPAIR         Home medications:   Current Discharge Medication List      CONTINUE these medications which have NOT CHANGED    Details   ibuprofen (ADVIL;MOTRIN) 800 MG tablet Take 1 tablet by mouth every 6 hours as needed for Pain. Qty: 40 tablet, Refills: 0             Social history:  reports that he quit smoking about 10 years ago. He does not have any smokeless tobacco history on file. He reports current alcohol use. He reports that he does not use drugs. Family history:  No family history on file. Exam  ED Triage Vitals [03/1960]   BP Temp Temp src Pulse Resp SpO2 Height Weight   -- -- -- -- 28 -- -- --     Nursing note and vitals reviewed. Constitutional: Ill appearing  HENT:      Head: Normocephalic      Ears: External ears normal.      Nose: Nose normal.     Mouth: Membrane mucosa moist   Eyes: No discharge. Neck: Supple. Trachea midline. Cardiovascular: tachy rate.  Warm extremities  Pulmonary/Chest: Effort increased, bilateral rails  Abdominal: Soft. No distension. Nontender  : Deferred  Rectal: Deferred   Musculoskeletal: Moves all extremities. No gross deformity. Neurological: Alert and oriented. Face symmetric. Speech is clear. Skin: Profusely diaphoretic  Psychiatric: Normal mood and affect. Behavior is normal.    Procedures      EKG  The Ekg interpreted by me in the absence of a cardiologist shows. Tachycardic sinus rhythm, heart rate 129, motion artifact, at least 3 mm ST elevation in lead V3, V4 with no priors for comparison    Radiology  XR CHEST PORTABLE   Final Result   Increased interstitial markings and hazy opacities bilaterally likely   reflecting interstitial edema. Atypical pneumonia cannot be excluded. Labs      Screenings           [facility] and ED Course  Patient is a 69-year-old man who presents with shortness of breath without chest pain. He is ill-appearing, tachycardic, hypoxic, severely diaphoretic with increased work of breathing. EKG reveals sinus tachycardia with ST elevations in leads V3, V4 that meet STEMI criteria. This was activated. Chest x-ray shows no obvious mediastinal widening. Without chest or back pain, lower suspicion for dissection. Will start patient on heparin and aspirin. Will give nitroglycerin. For increased work of breathing, was started on BiPAP. Work of breathing has since improved. Chest x-ray does show some pulmonary edema to me. ([facility])    The total critical care time spent while evaluating and treating this patient was at least 20 minutes. This excludes time spent doing separately billable procedures. This includes time at the bedside, data interpretation, medication management, obtaining critical history from collateral sources if the patient is unable to provide it directly, and physician consultation. Specifics of interventions taken and potentially life-threatening diagnostic considerations are listed above in the medical decision making.     [unfilled]    Final Impression  1. ST elevation myocardial infarction (STEMI), unspecified artery (HCC)        Blood pressure (!) 143/84, pulse 124, resp. rate 11, weight 180 lb (81.6 kg), SpO2 93 %. Disposition:  DISPOSITION Admitted 03/04/2021 10:07:15 PM      Patient Referrals:  No follow-up provider specified. Discharge Medications:  Current Discharge Medication List          Discontinued Medications:  Current Discharge Medication List          This chart was generated using the [employer] dictation system. I created this record but it may contain dictation errors given the limitations of this technology.         Christie Millan MD  03/04/21 8828

## 2021-03-05 NOTE — PRE SEDATION
Brief Pre-Op Note/Sedation Assessment      Mirian Martinez  1960  KIM/NONE      [id number]  9:25 PM    Planned Procedure: Cardiac Catheterization Procedure    Post Procedure Plan: Return to same level of care    Consent: I have discussed with the patient and/or the patient representative the indication, alternatives, and the possible risks and/or complications of the planned procedure and the anesthesia methods. The patient and/or patient representative appear to understand and agree to proceed. Chief Complaint: Dyspnea      Indications for Cath Procedure:  ACS <= 24 hrs and Suspected CAD  Anginal Classification within 2 weeks:  CCS IV - Inability to perform any activity without angina or angina at rest, i.e., severe limitation  NYHA Heart Failure Class within 2 weeks: Class IV - Symptoms of HF at rest, Newly Diagnosed? Yes, Heart Failure Type: Unknown  Is Cath Lab Visit Valve-related?: No  Surgical Risk: N/A  Functional Type: Unknown    Anti- Anginal Meds within 2 weeks:   No: Contraindicated  none. Acute case    Stress or Imaging Studies Performed (within 6 months):  None     Vital Signs:  BP (!) 143/84   Pulse 124   Resp 28   Wt 180 lb (81.6 kg)   SpO2 91%   BMI 25.46 kg/m²     Allergies:  No Known Allergies    Past Medical History:  No past medical history on file.       Surgical History:  Past Surgical History:   Procedure Laterality Date    HERNIA REPAIR           Medications:  Current Facility-Administered Medications   Medication Dose Route Frequency Provider Last Rate Last Admin    nitroGLYCERIN (NITROSTAT) SL tablet 0.4 mg  0.4 mg Sublingual Q5 Min PRN Ofelia Garrett MD        heparin (porcine) injection 4,000 Units  49 Units/kg Intravenous PRN Ofelia Garrett MD        heparin (porcine) injection 2,000 Units  24.5 Units/kg Intravenous PRN Ofelia Garrett MD        heparin 25,000 units in dextrose 5% 250 mL (premix) infusion  12 Units/kg/hr Intravenous Continuous Ofelia Garrett MD 9.8 mL/hr at 03/04/21 2023 12 Units/kg/hr at 03/04/21 2023     Current Outpatient Medications   Medication Sig Dispense Refill    ibuprofen (ADVIL;MOTRIN) 800 MG tablet Take 1 tablet by mouth every 6 hours as needed for Pain. 40 tablet 0           Pre-Sedation:    Pre-Sedation Documentation and Exam:  I have assessed the patient and agree with the H&P present on the chart. Prior History of Anesthesia Complications:   none    Modified Mallampati:  III (soft palate, base of uvula visible)    ASA Classification:  Class 4 - A patient with an incapacitating systemic disease that is a constant threat to life      Leonie Scale: Activity:  0 - Able to move 0 extremities voluntarily on command  Respiration:  1 - Dyspnic, shallow, or limited breathing  Circulation:  2 - BP+/- 20mmHg of normal  Consciousness:  0 - Not responsive  Oxygen Saturation (color):  1 - Needs oxygen to maintain oxygen saturation >90%    Sedation/Anesthesia Plan:  Guard the patient's safety and welfare. Minimize physical discomfort and pain. Minimize negative psychological responses to treatment by providing sedation and analgesia and maximize the potential amnesia. Patient to meet pre-procedure discharge plan.     Medication Planned:  midazolam intravenously and fentanyl intravenously    Patient is an appropriate candidate for plan of sedation: yes      Electronically signed by Nani Santos MD on 3/4/2021 at 9:25 PM

## 2021-03-05 NOTE — DISCHARGE INSTR - COC
Colostomy/Ileostomy/Ileal Conduit: No       Date of Last BM: 03/04/2021    Intake/Output Summary (Last 24 hours) at 3/5/2021 1455  Last data filed at 3/5/2021 1400  Gross per 24 hour   Intake 1478.66 ml   Output 1540 ml   Net -61.34 ml     I/O last 3 completed shifts:  In: -   Out: 620 [Urine:220; Emesis/NG output:400]    Safety Concerns: At Risk for Falls    Impairments/Disabilities:      None    Nutrition Therapy:  Current Nutrition Therapy:   - NPO    Routes of Feeding: Orogastric  Liquids: No Liquids  Daily Fluid Restriction: no  Last Modified Barium Swallow with Video (Video Swallowing Test): not done    Treatments at the Time of Hospital Discharge:   Respiratory Treatments: ***  Oxygen Therapy:  is not on home oxygen therapy.   Ventilator:    - Ventilator Settings:    Vt Ordered: 500 mL  Rate Set: 18 bmp  FiO2 : 40 %    PEEP/CPAP: 5       Rehab Therapies: ***  Weight Bearing Status/Restrictions: No weight bearing restirctions  Other Medical Equipment (for information only, NOT a DME order):  hospital bed  Other Treatments: ***    Patient's personal belongings (please select all that are sent with patient):  {[facility] DME Belongings:[id number]:::0}    RN SIGNATURE:  {Esignature:[id number]:::0}    CASE MANAGEMENT/SOCIAL WORK SECTION    Inpatient Status Date: ***    Readmission Risk Assessment Score:  Readmission Risk              Risk of Unplanned Readmission:        9           Discharging to Facility/ Agency   · Name:   · Address:  · Phone:  · Fax:    Dialysis Facility (if applicable)   · Name:  · Address:  · Dialysis Schedule:  · Phone:  · Fax:    / signature: {Esignature:[id number]:::0}    PHYSICIAN SECTION    Prognosis: {Prognosis:2146366201:::0}    Condition at Discharge: [place] Patient Condition:[id number]:::0}    Rehab Potential (if transferring to Rehab): {Prognosis:1830966802:::0}    Recommended Labs or Other Treatments After Discharge: ***    Physician Certification: I certify the above information and transfer of Lois Rangel  is necessary for the continuing treatment of the diagnosis listed and that he requires {Admit to Appropriate Level of Care:15690:::0} for {GREATER/LESS:314792594} 30 days.      Update Admission H&P: {CHP DME Changes in HandP:620559971:::0}    PHYSICIAN SIGNATURE:  {Esignature:[id number]:::0}

## 2021-03-05 NOTE — PROGRESS NOTES
Spoke with Lacho Valenzuela at [facility] admissions regarding transfer. Pt. Does not have a bed assignment at this time. \"Next in line for placement\". Will call back as soon as bed available.     Electronically signed by Suma Torres RN on 3/5/2021 at 4:32 PM

## 2021-03-05 NOTE — H&P
[facility]                     [facility], [address]                              HISTORY AND PHYSICAL    PATIENT NAME: Ajith Holbrook                     :        1960  MED REC NO:   [id number]                          ROOM:       2913  ACCOUNT NO:   [de-identified]                           ADMIT DATE: 2021  PROVIDER:     Frankey Keener, MD    DATE OF SERVICE:  I obtained the history and performed physical exam on  the patient in the cardiac cath lab on 2021. CHIEF COMPLAINT:  Shortness of breath. HISTORY OF PRESENT ILLNESS:  The patient presented with shortness of  breath, was taken to the cardiac cath lab, underwent coronary  angiography, was getting increasingly more and more obtunded post  angiography and hence we were consulted for assistance in management  including airway management. The patient was assessed by me and needed  emergency intubation. PAST MEDICAL HISTORY:  Chronic systolic congestive heart failure. PAST SURGICAL HISTORY:  No major surgical procedures. ALLERGIC HISTORY:  No known drug allergies. FAMILY HISTORY:  Unobtainable from the patient. SOCIAL HISTORY:  Lives at home. Has never been to a doctor in a very  long time. MEDICATIONS:  The patient is not on any home medications. REVIEW OF SYSTEMS:  Unobtainable because of the patient's mentation. Currently sedated and currently obtunded. PHYSICAL EXAMINATION:  VITAL SIGNS:  Temperature 96.5, respiratory rate 15, pulse initially was  105, blood pressure 87/51, at the time of my examination in the cath lab  during intubation it was 109/65, saturating 88-90% on BiPAP. CNS:  The patient is obtunded. PSYCH:  The patient does respond to painful stimuli, but very weakly. EYES:  Pupils are bilaterally equal, dilated. ENT:  No oral mucosal lesions. RESPIRATORY SYSTEM:  The patient has got basilar rales. CVS:  Non-tachycardic.   ABDOMEN: Nondistended. MUSCULOSKELETAL:  No acute musculoskeletal deformities. SKIN:  Bilateral lower extremity edema. DIAGNOSTIC DATA:  Venous blood gas showed a pH of 7.08. Chest x-ray  shows pulmonary edema. Lactic acid 4.6. D-dimer 261. BNP 3506. Sodium 131, chloride 96, carbon dioxide 19, potassium 3.7, BUN 10,  creatinine 1.1. CBC showed white count 15.6, hemoglobin 16.7,  hematocrit 49.7, platelets of 182. CONSULTATIONS:  Cardiology. Case was discussed independently by me with  Dr. Edd Blount from Interventional Cardiology in person who advised me that  the patient has severe multivessel coronary artery disease, will  probably need coronary artery bypass grafting and possible axillary  Impella placement. They recommended to continue the patient on  sustained diuresis. ASSESSMENT:  1. Acute combined systolic and diastolic congestive heart failure. 2.  Acute type 2 respiratory failure with pH of 7.02, pCO2 of 102  requiring intubation and mechanical ventilation. 3.  Pulmonary edema. PLAN OF CARE:  The patient is currently critically ill. The patient was  emergently intubated by me in the cardiac cath lab. The patient will be  transferred to the cardiovascular intensive care unit. The patient will  be started on sustained diuresis per Cardiology recommendations. The  patient will be continued on dual agent antiplatelet therapy per  Cardiology recommendations. We will continue the patient for now on  broad-spectrum antibiotics which will be discontinued once the patient  is hemodynamically stable and source of infection has been excluded. Pulmonary consult is requested for ventilator management and critical  care management in daytime. Heparin drip will be continued per  Cardiology recommendations. CODE STATUS:  Full. EXPECTED LENGTH OF STAY:  More than two midnights based on plan of care  above. RISK:  High due to the patient's presentation with severe acute heart  failure.     TOTAL CRITICAL CARE TIME:  40 minutes excluding the time required for  procedures. DISPOSITION:  Admitted to intensive care unit.         Aliya Chu MD    D: 03/05/2021 8:51:04       T: 03/05/2021 10:05:54     [email or web address]  Job#: [id number]     Doc#: [id number]    CC:

## 2021-03-05 NOTE — CONSULTS
Renal Consult  Full Note Dictated [id number]  A/P  1.   KUNAL-ATN + cardiorenal physiology. K better. Would have to follow if response to Lasix drip, otherwise, would need CRRT. Discussed with daughter, agreeable to plan. 2.   Hyperkalemia  3. Resp Acidosis +AGMA+NAGMA-no HCO3 at this time. 4.   Hypotension-on Levophed  5. Vol Overload  6. Multi-vessel CAD-Surgery and Cards following  7. Resp. Failure-on vent    High risk. Discussed with nurse. Thank you    Addendum:  Discussed with Surgery and Cards, planning to transfer to [facility], K now down to 5.2 with [address] dwelling. Debora for transfer from renal perspective. Discussed with nurse.

## 2021-03-05 NOTE — CONSULTS
Cardiothoracic Surgery Consultation           PCP: Iwona Beckford MD    Referring Physician: Dr. Yajaira Ellington    DIAGNOSIS:  STEMI, severe multivessel coronary artery disease    CHIEF COMPLAINT:  Shortness of breath    History Obtained From:  patient, electronic medical record    HISTORY OF PRESENT ILLNESS:      The patient is a 63 y.o. male with significant past medical history of tobacco and alcohol abuse (hasn't seen MD in >10 years, no home meds) who presents to the ER on 3/4/21 with acute shortness of breath after working on a home project. The shortness of breath started about 1-2 hours prior to arrival. Upon arrival to ED he was tachycardiac, diaphoretic, and EKG ruled in for STEMI. According to ED notes, patient denied any chest pain or pressure in the ER. Angiogram found severe multivessel coronary artery disease with EF 25-30%. CVTS was consulted for surgical evaluation for myocardial revascularization. Patient developed acute respiratory failure last evening during angiogram.  BIPAP tried but patient ultimately had to be intubated. Patient is currently hemodynamically stable on levophed, and lasix gtts. Patient is a current smoker. The patient was loaded with 180 mg Brilinta on 3/4 @2030. Past Medical History:    No past medical history on file. Past Surgical History:        Procedure Laterality Date    HERNIA REPAIR         Medications:   Home Meds:   Prior to Admission medications    Medication Sig Start Date End Date Taking? Authorizing Provider   ibuprofen (ADVIL;MOTRIN) 800 MG tablet Take 1 tablet by mouth every 6 hours as needed for Pain.  1/9/15   Kem Christopher PA-C      Scheduled Meds:    sodium chloride flush  10 mL Intravenous 2 times per day    pantoprazole  40 mg Intravenous Daily    cefepime  2,000 mg Intravenous Q12H    rocuronium  50 mg Intravenous Once    etomidate  10 mg Intravenous Once    propofol  50 mg Intravenous Once    [START ON 3/6/2021] vancomycin  1,250 mg ON 3/6/2021] vancomycin (VANCOCIN) 1,250 mg in dextrose 5 % 250 mL IVPB  1,250 mg Intravenous Q24H Yulia Barnes MD        insulin regular (HUMULIN R;NOVOLIN R) injection 10 Units  10 Units Intravenous Once Yulia Barnes MD        And    dextrose 50 % IV solution  25 g Intravenous Once Yulia Barnes MD        glucose (GLUTOSE) 40 % oral gel 15 g  15 g Oral PRN Yulia Barnes MD        dextrose 50 % IV solution  12.5 g Intravenous PRN Yulia Barnes MD        glucagon (rDNA) injection 1 mg  1 mg Intramuscular PRN Yulia Barnes MD        dextrose 5 % solution  100 mL/hr Intravenous PRN Yulia Barnes MD        sodium polystyrene (KAYEXALATE) 15 GM/60ML suspension 15 g  15 g Oral Once Yulia Barnes MD        calcium gluconate 1000 mg in sodium chloride 50 mL  1,000 mg Intravenous Once Yulia Barnse MD        heparin (porcine) injection 4,000 Units  49 Units/kg Intravenous PRN Sherol Gitelman, MD        heparin (porcine) injection 2,000 Units  24.5 Units/kg Intravenous PRN Sherol Gitelman, MD        heparin 25,000 units in dextrose 5% 250 mL (premix) infusion  12 Units/kg/hr Intravenous Continuous Sherol Gitelman, MD 9.8 mL/hr at 03/04/21 2302 12 Units/kg/hr at 03/04/21 2302    perflutren lipid microspheres (DEFINITY) injection 1.65 mg  1.5 mL Intravenous ONCE PRN Sherol Gitelman, MD        furosemide (LASIX) 100 mg in dextrose 5 % 100 mL infusion  10 mg/hr Intravenous Continuous Sherol Gitelman, MD 10 mL/hr at 03/05/21 0654 10 mg/hr at 03/05/21 0654    sodium chloride 0.9 % infusion             propofol 1000 MG/100ML injection             propofol injection  10 mcg/kg/min Intravenous Titrated Robert Bro MD 19.3 mL/hr at 03/05/21 0855 40 mcg/kg/min at 03/05/21 0855       Allergies:  Patient has no known allergies. Social History:    TOBACCO:   reports that he quit smoking cigarettes about 10 years ago. He smoked 2ppd for 39 years. He still smokes cigars- frequesncy unknown.    ETOH:  Drinks beer every day DRUGS:   reports no history of drug use. LIFESTYLE: very active   MARITAL STATUS: , remarried, 6 kids with ex  OCCUPATION:  Manual labor     Family History:    Sisters with heart disease    REVIEW OF SYSTEMS:  Patient sedated on vent, unable to interview    PHYSICAL EXAM:    VITALS:  BP (!) 136/90   Pulse 85   Temp 100.1 °F (37.8 °C) (Bladder)   Resp 28   Ht 5' 10\" (1.778 m)   Wt 177 lb 4 oz (80.4 kg)   SpO2 92%   BMI 25.43 kg/m²   Hand Dominance: Right     Eyes:  lids and lashes normal, pupils equal and round, extra ocular muscles intact, sclera clear, conjunctiva normal    Head/ENT:  Normocephalic, atraumatic, full upper and lower dentures, normal gums, & palate, oropharynx without erythema or exudates    Neck:  supple, symmetrical, trachea midline, no lymphadenopathy, no jugular venous distension, no carotid bruits and MASSES:  no masses    Lungs: On vent AC rate 18, , 60%, PEEP 5.  no increased work of breathing (sedated on diprivan), good air exchange, no retractions and clear to auscultation, no palpable / percussible abnormalities    Cardiovascular:  regular rate and rhythm, S1, S2 normal, no murmur, click, rub or gallop    Pulses:     carotid brachial radial femoral popliteal DP PT   RIGHT 2+ 2+ 1+ 2+ 1+ DP absent   LEFT 2+ 2+ 1+ 2+ 1+ DP DP   Pedal pulses dopplerable = DP    Abdomen:  OG tube to continuous CLWS - green bile.  Soft, hypoactive bowel sounds, non-tender, no hepatosplenomegaly, aorta likely normal and bruits absent    Musculoskeletal:  Moves all extremities spontaneously, sedated    Extremities:  Bilateral feet cold, mottled bottoms, No clubbing, or edema     Skin: Scattered scars bilateral arms from occupational injuries, warm and normal turgor, no ulcers, infections, or rashes, no rashes, no ecchymoses, no petechiae, no nodules, no jaundice    Neurological: sedated on ventilator    Psychiatric: unable to assess     LABS:  BMP:   Lab Results   Component Value Date    NA 126 03/05/2021    K 6.3 03/05/2021    K 3.8 03/05/2021    CL 96 03/05/2021    CO2 19 03/05/2021    BUN 18 03/05/2021    CREATININE 1.8 03/05/2021      No components found for: GLU  Lab Results   Component Value Date    MG 2.30 03/05/2021      CBC:   Lab Results   Component Value Date    WBC 17.2 03/05/2021    HGB 15.8 03/05/2021    HCT 47.4 03/05/2021    MCV 99.1 03/05/2021     03/05/2021      Coagulation:   Lab Results   Component Value Date    INR 1.07 03/04/2021    APTT 59.5 03/05/2021     Cardiac markers:   Lab Results   Component Value Date    TROPONINI 0.05 03/04/2021     HgbA1c:  No results found for: LABA1C   Hepatic Profile:  No results found for: ALB    Lab Results   Component Value Date    BILITOT 0.9 03/04/2021    AST 38 03/04/2021    ALT 28 03/04/2021    ALKPHOS 106 03/04/2021      Cholesterol:  No results found for: CHOL, HDL, LDLCALC, TRIG   UA:  Lab Results   Component Value Date    COLORU YELLOW 03/04/2021    PHUR 5.0 03/04/2021    WBCUA 4 03/04/2021    RBCUA 1 03/04/2021    CLARITYU Clear 03/04/2021    SPECGRAV 1.011 03/04/2021    LEUKOCYTESUR TRACE 03/04/2021    UROBILINOGEN 0.2 03/04/2021    BILIRUBINUR Negative 03/04/2021    BLOODU Negative 03/04/2021    GLUCOSEU Negative 03/04/2021       TESTING/IMAGING  EKG: 3/4  ST with PVCs    ANGIOGRAM: 3/4/2021  1. Left main coronary artery was normal. It gave off the left anterior descending artery and left circumflex.   2. Left anterior descending artery has severe atherosclerotic disease. It was moderate in size. It gave off septal perforators and a moderate sized diagonal branch. The LAD covered the entire apex of the left ventricle.               ~100% proximal occlsion              ~    3. Left circumflex has severe atherosclerotic disease. It was moderate in size. There was a moderate sized obtuse marginal branch.              ~100% proximal occlusion              ~   4. Right coronary artery has severe atherosclerotic disease.  It was moderate in size and was the dominant artery. ~100% mid occlusion              ~   5. Left ventriculogram showed severe LVEF 25-30%. There was no significant mitral valve or aortic valve disease noted. LVEDP was severely elevated. There was no gradient noted across the aortic valve during pullback of the catheter. CXRAY:  3/5/2021  1. Endotracheal tube tip is 6 cm above the declan. 2. The enteric tube is in good position in the stomach. 3. Worsening pulmonary edema. ECHO: 3/5/2021  2-3+ central MR due to leaflet tethering, EF 13-22%    [id number] Score for Atrial Fibrillation Stroke Risk   Risk   Factors  Component Value   C CHF Yes 1   H HTN No 0   A2 Age >= 75 No,  (61 y.o.) 0   D DM No 0   S2 Prior Stroke/TIA No 0   V Vascular Disease Yes 1   A Age 74-69 No,  (61 y.o.) 0   Sc Sex male 0    [id number]  Score  2   Score last updated 3/5/21 2:08 AM EST    Click here for a link to the UpToDate guideline \"Atrial Fibrillation: Anticoagulation therapy to prevent embolization    Disclaimer: Risk Score calculation is dependent on accuracy of patient problem list and past encounter diagnosis. ASSESSMENT AND PLAN:    Diffuse 3V CAD with poor distal target, ischemic cardiomyopathy and ischemic MR, acute CHF, acute RF with hyperkalemia, PVD    I discussed the patient with Dr. Jailyn Kemp and the family. Given age and degree of coronary disease and ventricular dysfunction, best option is evaluation at VAD / transplant center. All the family's questions answered and they are amenable to transfer to [facility]. Anitha     Electronically signed by JUAN Solis CNP on 3/5/2021 at 9:41 AM  Electronically signed by Arias Singh MD on 3/5/2021 at 12:09 PM

## 2021-03-05 NOTE — POST SEDATION
Patient:  Adrienne Munoz   :   1960    A pre-sedation re-evaluation was performed immediately at the end of the procedure. Procedure:  Cardiac cath  Medications: Procedural sedation with minimal conscious sedation  Complications: None  Estimated Blood Loss: none  Specimens: Were not obtained        [facility] Medication and Procedural Reconciliation:  I agree that the documented medications and procedures performed are true. The medications were given under my order. The procedures were performed under my direct supervision.

## 2021-03-05 NOTE — FLOWSHEET NOTE
Received from cath lab via bed with RNs. On vent rate 12, CO2 on monitor reading 61, abgs drawn and call placed to Dr Megan Augustin. Rate increased to 20, then back to 18, r groin sheath intact, no bleeding, no hematoma. 12  Wife at bedside, updated on condition. All questions answered.

## 2021-03-05 NOTE — PROGRESS NOTES
AM labs show K 6.5, Dr. Gera Foster notified face to face and via perfect serve. Order to repeat K lab. Repeat K shows 6.3. Dr. Gera Foster notified via perfect serve. Waiting new orders.

## 2021-03-05 NOTE — PROGRESS NOTES
03/04/21 2225   Vent Patient Data   Static Compliance 34 mL/cmH2O   Dynamic Compliance 17.74 mL/cmH2O

## 2021-03-05 NOTE — PROGRESS NOTES
aptt therapeutic x2. Heparin gtt infusing at 12 units/kg/min.     Electronically signed by Alexi Abebe RN on 3/5/2021 at 11:52 AM

## 2021-03-05 NOTE — PROGRESS NOTES
Clinical Pharmacy Note:    Pharmacy consulted to dose Vancomycin for pneumonia per Dr. Shantelle Martinez. Age: 63  Height: 5'10\"  Weight: 80.4 kg  Scr: 1.3 mg/dL  Goal Trough: 15-20 mcg/ml      Started Vancomycin 1250 IV q18. Trough ordered before 4th dose (3/07/21 @0830) with an order to hold if trough greater than 20mcg/ml. Thanks for the consult!   Janice Crowley, PharmD, [facility]

## 2021-03-05 NOTE — PROGRESS NOTES
Comprehensive Nutrition Assessment    Type and Reason for Visit:  Initial(vent)    Nutrition Recommendations/Plan:   1. Continue NPO status    Nutrition Assessment:  Pt at risk for nutritional compromise d/t intubation. Pt was eating well pta. Wt stable. Pt s/p cardiac cath. Pt currently on diprivan at 24 ml/hr providing 634  juan and also on Levo at 6.6 ml/hr. K 5.2H. Malnutrition Assessment:  Malnutrition Status:  No malnutrition      Estimated Daily Nutrient Needs:  Energy (kcal):  3088-3205 juan (25-30 cals/80kg); Weight Used for Energy Requirements:  Current     Protein (g):   gms (1.2-2.0 gms/80kg); Weight Used for Protein Requirements:  Current        Fluid (ml/day):   ; Method Used for Fluid Requirements:  1 ml/kcal      Nutrition Related Findings:  No edema noted. I/O's:+338      Wounds:  None       Current Nutrition Therapies:    Diet NPO Effective Now    Anthropometric Measures:  · Height: 5' 10\" (177.8 cm)  · Current Body Weight: 177 lb (80.3 kg)   · Ideal Body Weight: 166 lbs; % Ideal Body Weight     · BMI: 25.4  · BMI Categories: Overweight (BMI 25.0-29. 9)       Nutrition Diagnosis:   · Inadequate energy intake related to inadequate protein-energy intake as evidenced by NPO or clear liquid status due to medical condition, intubation      Nutrition Interventions:   Food and/or Nutrient Delivery:  Continue NPO  Nutrition Education/Counseling:  Education not indicated   Coordination of Nutrition Care:  Continue to monitor while inpatient    Goals:  Initiate TF once pt hemodynamically stable       Nutrition Monitoring and Evaluation:   Behavioral-Environmental Outcomes:  None Identified   Food/Nutrient Intake Outcomes:  Enteral Nutrition Intake/Tolerance  Physical Signs/Symptoms Outcomes:  Weight, Hemodynamic Status, Biochemical Data(K level)     Discharge Planning:     Too soon to determine     Electronically signed by Olive Napier RD, CNSC, LD on 3/5/21 at 1:10 PM EST    Contact: [phone]

## 2021-03-05 NOTE — CONSULTS
(AMIDATE) injection 10 mg, 10 mg, Intravenous, Once  propofol injection 50 mg, 50 mg, Intravenous, Once  [START ON 3/6/2021] vancomycin (VANCOCIN) 1,250 mg in dextrose 5 % 250 mL IVPB, 1,250 mg, Intravenous, Q24H  glucose (GLUTOSE) 40 % oral gel 15 g, 15 g, Oral, PRN  dextrose 50 % IV solution, 12.5 g, Intravenous, PRN  glucagon (rDNA) injection 1 mg, 1 mg, Intramuscular, PRN  dextrose 5 % solution, 100 mL/hr, Intravenous, PRN  calcium gluconate 1000 mg in sodium chloride 50 mL, 1,000 mg, Intravenous, Once  sodium zirconium cyclosilicate (LOKELMA) oral suspension 10 g, 10 g, Oral, TID  heparin (porcine) injection 4,000 Units, 49 Units/kg, Intravenous, PRN  heparin (porcine) injection 2,000 Units, 24.5 Units/kg, Intravenous, PRN  heparin 25,000 units in dextrose 5% 250 mL (premix) infusion, 12 Units/kg/hr, Intravenous, Continuous  perflutren lipid microspheres (DEFINITY) injection 1.65 mg, 1.5 mL, Intravenous, ONCE PRN  furosemide (LASIX) 100 mg in dextrose 5 % 100 mL infusion, 10 mg/hr, Intravenous, Continuous  propofol 1000 MG/100ML injection, , ,   propofol injection, 10 mcg/kg/min, Intravenous, Titrated    No Known Allergies    Social History:    TOBACCO:   reports that he quit smoking about 10 years ago. He does not have any smokeless tobacco history on file. ETOH:   reports current alcohol use. Patient currently lives independently  Environmental/chemical exposure: None known    Family History:   No family history on file. REVIEW OF SYSTEMS:    ROS is unobtainable due to his critical illness.       Objective:   PHYSICAL EXAM:      VITALS:  BP (!) 136/90   Pulse 83   Temp 100.1 °F (37.8 °C) (Bladder)   Resp 18   Ht 5' 10\" (1.778 m)   Wt 177 lb 4 oz (80.4 kg)   SpO2 100%   BMI 25.43 kg/m²      24HR INTAKE/OUTPUT:      Intake/Output Summary (Last 24 hours) at 3/5/2021 1106  Last data filed at 3/5/2021 0830  Gross per 24 hour   Intake 449 ml   Output 980 ml   Net -531 ml     CONSTITUTIONAL: Sedated on edema.  Endotracheal tube is in good position. I have reviewed and adjusted ventilator settings at the bedside  He is on AC/VC with tidal volume 500 cc, FiO2 0.6 and PEEP 5. ABG is 7.3 1/39/305  Wean oxygen as tolerated  Not ready to wean from mechanical ventilation    Cardiac cath revealed multivessel coronary artery disease with an EF around 30%. The patient is diuresing some  Cardiology is following    Patient presented with KUNAL. Creatinine is 1.8. He is nonoliguric but he is hyperkalemic. Nephrology is following    He is hypotensive due to cardiogenic shock  On norepinephrine  He is also on vancomycin and cefepime over concern for pneumonia  Imaging appears more consistent with pulmonary edema  Obtain procalcitonin and MRSA nasal probe  De-escalate antibiotics as these results become available    I spoke with the patient's family at the bedside. All questions were addressed    Total critical care time caring for this patient with life threatening illness, including direct patient contact, management of life support systems, review of data including imaging and labs, discussions with other team members and physicians is at least 32 minutes so far today, excluding procedures.

## 2021-03-05 NOTE — CONSULTS
upt\[facility]\"" 124                     350 [facility], [address]                                  CONSULTATION    PATIENT NAME: Peterson Kuo                     :        1960  MED REC NO:   [id number]                          ROOM:       2913  ACCOUNT NO:   [de-identified]                           ADMIT DATE: 2021  PROVIDER:     Zurdo Mobley MD    RENAL CONSULTATION    CONSULT DATE:  2021    CONSULTING PHYSICIAN:  Zurdo Mobley MD    REFERRING PROVIDER:  Daquan Rosas MD    REASON FOR CONSULTATION:  Acute kidney injury, hyponatremia,  hyperkalemia, metabolic acidosis. HISTORY OF PRESENT ILLNESS:  The patient is a 69-year-old male who has  not seen a physician for about 10 years who presented to the hospital  early this morning secondary to shortness of breath. He was taken to  the cardiac cath lab and underwent coronary angiography which showed  multivessel disease. His EF was less than 30% with severely elevated  LVEDP of 44. I am asked to see the patient because his creatinine has  increased from 1.1 to 1.8 and currently still at 1.8 with decreased  urine output despite Lasix drip. Potassium is also elevated at 6.3 with  a serum bicarbonate of 19. He is currently on Levophed. PAST MEDICAL HISTORY:  Recent diagnosis of multivessel coronary artery  disease. ALLERGIES:  None. MEDICATIONS PRIOR TO ADMISSION:  Includes ibuprofen. Current  medications include Levophed, Lasix at 10 mg per hour, pantoprazole,  vancomycin, heparin, propofol. SOCIAL HISTORY:  Lives at home. He has not seen a physician for at  least 10 years. FAMILY HISTORY:  Unable to obtain. REVIEW OF SYSTEMS:  Not able to cooperate. PHYSICAL EXAMINATION:  VITAL SIGNS:  Blood pressure 102/62, pulse 88, respirations 23,  temperature 99.7, saturating 100%, FiO2 60%. GENERAL:  On the vent. HEENT:  Anicteric sclerae, clear conjunctivae. SKIN:  Anicteric. No rash.

## 2021-03-06 LAB
ESTIMATED AVERAGE GLUCOSE: 128.4 MG/DL
HBA1C MFR BLD: 6.1 %
MRSA SCREEN RT-PCR: NORMAL

## 2021-03-06 NOTE — PROGRESS NOTES
Pt discharge to [facility] in [place] [address]. Report given to Doug Adamson at [facility] and flight crew. Copy of chart, AVS, ANANTH, and negative covid results sent with pt. Pt had not belongings.     Electronically signed by Anthoney Felty, RN on 3/5/2021 at 7:28 PM

## 2021-03-08 LAB
LV EF: 30 %
LVEF MODALITY: NORMAL

## 2021-12-02 ENCOUNTER — HOSPITAL ENCOUNTER (EMERGENCY)
Age: 61
Discharge: HOME | End: 2021-12-02
Attending: EMERGENCY MEDICINE
Payer: COMMERCIAL

## 2021-12-02 ENCOUNTER — TELEPHONE (OUTPATIENT)
Dept: FAMILY MEDICINE CLINIC | Age: 61
End: 2021-12-02

## 2021-12-02 VITALS
HEART RATE: 80 BPM | SYSTOLIC BLOOD PRESSURE: 128 MMHG | WEIGHT: 169.31 LBS | OXYGEN SATURATION: 100 % | TEMPERATURE: 98.6 F | DIASTOLIC BLOOD PRESSURE: 66 MMHG | BODY MASS INDEX: 24.29 KG/M2 | RESPIRATION RATE: 18 BRPM

## 2021-12-02 DIAGNOSIS — M54.32 SCIATICA OF LEFT SIDE: Primary | ICD-10-CM

## 2021-12-02 PROCEDURE — 99283 EMERGENCY DEPT VISIT LOW MDM: CPT

## 2021-12-02 PROCEDURE — 6360000002 HC RX W HCPCS: Performed by: EMERGENCY MEDICINE

## 2021-12-02 PROCEDURE — 96372 THER/PROPH/DIAG INJ SC/IM: CPT

## 2021-12-02 RX ORDER — KETOROLAC TROMETHAMINE 30 MG/ML
30 INJECTION, SOLUTION INTRAMUSCULAR; INTRAVENOUS ONCE
Status: COMPLETED | OUTPATIENT
Start: 2021-12-02 | End: 2021-12-02

## 2021-12-02 RX ORDER — LIDOCAINE 4 G/G
1 PATCH TOPICAL DAILY
Qty: 10 PATCH | Refills: 0 | Status: SHIPPED | OUTPATIENT
Start: 2021-12-02 | End: 2021-12-12

## 2021-12-02 RX ORDER — METHOCARBAMOL 500 MG/1
500 TABLET, FILM COATED ORAL 3 TIMES DAILY PRN
Qty: 10 TABLET | Refills: 0 | Status: SHIPPED | OUTPATIENT
Start: 2021-12-02 | End: 2022-08-19

## 2021-12-02 RX ORDER — HYDROCODONE BITARTRATE AND ACETAMINOPHEN 5; 325 MG/1; MG/1
1 TABLET ORAL EVERY 4 HOURS PRN
Qty: 5 TABLET | Refills: 0 | Status: SHIPPED | OUTPATIENT
Start: 2021-12-02 | End: 2021-12-05

## 2021-12-02 RX ADMIN — KETOROLAC TROMETHAMINE 30 MG: 30 INJECTION, SOLUTION INTRAMUSCULAR; INTRAVENOUS at 15:09

## 2021-12-02 ASSESSMENT — PAIN SCALES - GENERAL
PAINLEVEL_OUTOF10: 9
PAINLEVEL_OUTOF10: 10
PAINLEVEL_OUTOF10: 6

## 2021-12-02 ASSESSMENT — PAIN DESCRIPTION - FREQUENCY: FREQUENCY: CONTINUOUS

## 2021-12-02 ASSESSMENT — PAIN DESCRIPTION - LOCATION
LOCATION: BACK
LOCATION: BACK;LEG

## 2021-12-02 ASSESSMENT — PAIN DESCRIPTION - DESCRIPTORS
DESCRIPTORS: ACHING;SHARP
DESCRIPTORS: SHOOTING

## 2021-12-02 ASSESSMENT — PAIN DESCRIPTION - PAIN TYPE: TYPE: ACUTE PAIN

## 2021-12-02 ASSESSMENT — PAIN DESCRIPTION - ORIENTATION
ORIENTATION: LEFT
ORIENTATION: LEFT

## 2021-12-02 ASSESSMENT — PAIN DESCRIPTION - ONSET: ONSET: ON-GOING

## 2021-12-02 ASSESSMENT — PAIN DESCRIPTION - PROGRESSION: CLINICAL_PROGRESSION: GRADUALLY IMPROVING

## 2021-12-02 NOTE — TELEPHONE ENCOUNTER
----- Message from Ariana Araiza sent at 12/2/2021 12:05 PM EST -----  Subject: Message to Provider    QUESTIONS  Information for Provider? Pt wants Dr. Brooklynn Strauss to call him because he is   having sciatic nerve pain in his lower pain and would rather speak with   the doctor instead of scheduling an appt at this time for some advice. Pt   stated that his pain has been going on since the weekend.   ---------------------------------------------------------------------------  --------------  [address]  What is the best way for the office to contact you? OK to leave message on   voicemail  Preferred Call Back Phone Number? [phone]  ---------------------------------------------------------------------------  --------------  SCRIPT ANSWERS  Relationship to Patient?  Self

## 2021-12-02 NOTE — ED NOTES
Pt walked in holt and back to room. Pt was then given discharge instructions and voiced understanding.       Beth Mayfield RN  12/02/21 4702

## 2021-12-02 NOTE — ED PROVIDER NOTES
[address] COMPLAINT  Back Pain (started monday, has been trying creams; left side)       HISTORY OF PRESENT ILLNESS  Ragini Moffett is a 57 y.o. male with history of CAD who presents to the emergency department for evaluation of back pain. Patient reports he was twisting as he was getting out of his car on Monday when he felt a sharp pain to the left lower back. Reports since then, with certain twisting motions or with sitting down, he gets sharp pain radiating from the left lower back towards the back of his thigh. Denies having anything like this in the past.  Says he was told by his boss that he should go to the emergency department for a shot as that was helpful for her when she was diagnosed with sciatica. Says he has tried hot and cold packs at home. He has also tried some cream but has not been helpful with pain. Says when the pain is severe it is a 10 out of 10. Currently it is a 6 out of 10. Says pain is better with standing and walking around. Denies having urinary or bowel incontinence. No saddle anesthesia. Denies having weakness of any extremities. No other complaints, modifying factors or associated symptoms. I have reviewed the following from the nursing documentation. No past medical history on file. Past Surgical History:   Procedure Laterality Date    HERNIA REPAIR       No family history on file.   Social History     Socioeconomic History    Marital status: Legally      Spouse name: Not on file    Number of children: Not on file    Years of education: Not on file    Highest education level: Not on file   Occupational History    Not on file   Tobacco Use    Smoking status: Former Smoker     Quit date: 1/9/2011     Years since quitting: 10.9    Smokeless tobacco: Not on file   Substance and Sexual Activity    Alcohol use: Yes     Comment: occas    Drug use: No    Sexual activity: Not on file   Other Topics Concern    Not on file   Social History Narrative    Not on file     Social Determinants of Health     Financial Resource Strain:     Difficulty of Paying Living Expenses: Not on file   Food Insecurity:     Worried About Running Out of Food in the Last Year: Not on file    Nicolette of Food in the Last Year: Not on file   Transportation Needs:     Lack of Transportation (Medical): Not on file    Lack of Transportation (Non-Medical): Not on file   Physical Activity:     Days of Exercise per Week: Not on file    Minutes of Exercise per Session: Not on file   Stress:     Feeling of Stress : Not on file   Social Connections:     Frequency of Communication with Friends and Family: Not on file    Frequency of Social Gatherings with Friends and Family: Not on file    Attends [employer] Services: Not on file    Active Member of [employer] or Organizations: Not on file    Attends Club or Organization Meetings: Not on file    Marital Status: Not on file   Intimate Partner Violence:     Fear of Current or Ex-Partner: Not on file    Emotionally Abused: Not on file    Physically Abused: Not on file    Sexually Abused: Not on file   Housing Stability:     Unable to Pay for Housing in the Last Year: Not on file    Number of [place] in the Last Year: Not on file    Unstable Housing in the Last Year: Not on file     No current facility-administered medications for this encounter. Current Outpatient Medications   Medication Sig Dispense Refill    methocarbamol (ROBAXIN) 500 MG tablet Take 1 tablet by mouth 3 times daily as needed (pain) 10 tablet 0    HYDROcodone-acetaminophen (NORCO) 5-325 MG per tablet Take 1 tablet by mouth every 4 hours as needed for Pain for up to 3 days. Intended supply: 3 days. Take lowest dose possible to manage pain 5 tablet 0     No Known Allergies    REVIEW OF SYSTEMS  10 systems reviewed, pertinent positives per HPI otherwise noted to be negative.     PHYSICAL EXAM  BP 132/64   Pulse 89   Temp 98.6 °F (37 °C) (Temporal)   Resp 16   Wt 169 lb 5 oz (76.8 kg)   SpO2 97%   BMI 24.29 kg/m²    GENERAL APPEARANCE: Awake and alert. No acute distress. HENT: Normocephalic. Atraumatic. PERRL. EOMI. No facial droop. HEART/CHEST: RRR. BACK:  left lower lumbar tenderness to palpation. No midline C/T/L-spine step-offs or tenderness to palpation  LUNGS: Respirations unlabored. Speaking comfortably in full sentences. ABDOMEN: Soft, non-distended abdomen. Non tender to palpation. No guarding. No rebound. EXTREMITIES: no gross deformities. Moving all extremities. SKIN: Warm and dry. No acute rashes. NEUROLOGICAL: Alert and oriented. No gross facial drooping. Answering questions appropriately. Moving all extremities. PSYCHIATRIC: Pleasant. Normal mood and affect. LABS  No results found for this visit on 12/02/21. I have reviewed all labs for this visit. RADIOLOGY  No results found. ED COURSE/MDM  Patient seen and evaluated. At presentation, patient was awake, alert, afebrile, hemodynamically stable, and satting well on room. Exam remarkable for left lower lumbar tenderness to palpation. No midline C/T/L-spine step-offs or tenderness to palpation present. Patient denies having any red flag symptoms such as urinary or bowel incontinence, or saddle anesthesia. Exam consistent with sciatica versus lumbar strain given the onset of pain after twisting motion. Patient instructed to follow-up with his PCP for further evaluation and treatment. He was given Toradol in the ED. On reassessment, patient reports improved symptoms. He was given prescription of Norco for breakthrough pain and muscle relaxers that he can try at home as needed for symptoms. . Due to patient's age and risk factors, discussed getting labs, but patient declined.   As patient denies having any midline back pain, I have low concern for vertebral fracture, epidural hematoma, cauda equina, or epidural abscess, and do not think patient needs emergent imaging at this time. Discussed following up with his PCP tomorrow. Patient agreeable with plan. He was instructed not to drive or operate heavy machinery while taking these medications as it may cause drowsiness. Patient discharged home with strict precautions. He was discharged home with strict return precautions. Pt was seen during the [place] 19 pandemic. Appropriate PPE worn by ME during patient encounters. Pt seen during a time with constrained hospital bed capacity and other potential inpatient and outpatient resources were constrained due to the viral pandemic. During the patient's ED course, the patient was given:  Medications   ketorolac (TORADOL) injection 30 mg (30 mg IntraMUSCular Given 12/2/21 9673)        CLINICAL IMPRESSION  1. Sciatica of left side        Blood pressure 132/64, pulse 89, temperature 98.6 °F (37 °C), temperature source Temporal, resp. rate 16, weight 169 lb 5 oz (76.8 kg), SpO2 97 %. Threasa Hearing was discharged home in stable condition. Patient was given scripts for the following medications. I counseled patient how to take these medications. New Prescriptions    HYDROCODONE-ACETAMINOPHEN (NORCO) 5-325 MG PER TABLET    Take 1 tablet by mouth every 4 hours as needed for Pain for up to 3 days. Intended supply: 3 days. Take lowest dose possible to manage pain    METHOCARBAMOL (ROBAXIN) 500 MG TABLET    Take 1 tablet by mouth 3 times daily as needed (pain)       Follow-up with:  No follow-up provider specified. DISCLAIMER: This chart was created using Dragon dictation software. Efforts were made by me to ensure accuracy, however some errors may be present due to limitations of this technology and occasionally words are not transcribed correctly.         Wagner Hughes MD  12/05/21 [id number]

## 2021-12-09 ENCOUNTER — OFFICE VISIT (OUTPATIENT)
Dept: FAMILY MEDICINE CLINIC | Age: 61
End: 2021-12-09
Payer: COMMERCIAL

## 2021-12-09 VITALS
BODY MASS INDEX: 23.91 KG/M2 | DIASTOLIC BLOOD PRESSURE: 74 MMHG | OXYGEN SATURATION: 99 % | HEART RATE: 76 BPM | SYSTOLIC BLOOD PRESSURE: 118 MMHG | WEIGHT: 167 LBS | HEIGHT: 70 IN

## 2021-12-09 DIAGNOSIS — Z76.89 ENCOUNTER TO ESTABLISH CARE: ICD-10-CM

## 2021-12-09 DIAGNOSIS — I25.10 CORONARY ARTERY DISEASE INVOLVING NATIVE CORONARY ARTERY OF NATIVE HEART WITHOUT ANGINA PECTORIS: ICD-10-CM

## 2021-12-09 DIAGNOSIS — I48.0 PAROXYSMAL ATRIAL FIBRILLATION (HCC): ICD-10-CM

## 2021-12-09 DIAGNOSIS — M54.32 LEFT SIDED SCIATICA: Primary | ICD-10-CM

## 2021-12-09 PROBLEM — I25.5 ISCHEMIC CARDIOMYOPATHY: Status: ACTIVE | Noted: 2021-05-25

## 2021-12-09 PROBLEM — Z95.1 S/P CABG (CORONARY ARTERY BYPASS GRAFT): Status: ACTIVE | Noted: 2021-05-25

## 2021-12-09 PROBLEM — I50.23 ACUTE ON CHRONIC SYSTOLIC CHF (CONGESTIVE HEART FAILURE), NYHA CLASS 4 (HCC): Status: RESOLVED | Noted: 2021-03-04 | Resolved: 2021-12-09

## 2021-12-09 PROBLEM — I34.0 MODERATE MITRAL REGURGITATION: Status: ACTIVE | Noted: 2021-05-25

## 2021-12-09 PROCEDURE — 99204 OFFICE O/P NEW MOD 45 MIN: CPT | Performed by: NURSE PRACTITIONER

## 2021-12-09 RX ORDER — METHYLPREDNISOLONE 4 MG/1
TABLET ORAL
Qty: 1 KIT | Refills: 0 | Status: SHIPPED | OUTPATIENT
Start: 2021-12-09 | End: 2021-12-15

## 2021-12-09 RX ORDER — ASPIRIN 81 MG/1
81 TABLET ORAL DAILY
COMMUNITY
Start: 2021-11-15 | End: 2022-08-19

## 2021-12-09 RX ORDER — AMIODARONE HYDROCHLORIDE 200 MG/1
TABLET ORAL
COMMUNITY
Start: 2021-11-22

## 2021-12-09 RX ORDER — ATORVASTATIN CALCIUM 80 MG/1
TABLET, FILM COATED ORAL
COMMUNITY
Start: 2021-11-15

## 2021-12-09 RX ORDER — TORSEMIDE 20 MG/1
TABLET ORAL
COMMUNITY
Start: 2021-10-11

## 2021-12-09 RX ORDER — WARFARIN SODIUM 1 MG/1
TABLET ORAL
COMMUNITY
Start: 2021-11-18 | End: 2022-08-19

## 2021-12-09 RX ORDER — METOPROLOL SUCCINATE 25 MG/1
25 TABLET, EXTENDED RELEASE ORAL DAILY
COMMUNITY
Start: 2021-11-15 | End: 2022-08-19

## 2021-12-09 SDOH — ECONOMIC STABILITY: TRANSPORTATION INSECURITY
IN THE PAST 12 MONTHS, HAS THE LACK OF TRANSPORTATION KEPT YOU FROM MEDICAL APPOINTMENTS OR FROM GETTING MEDICATIONS?: NO

## 2021-12-09 SDOH — ECONOMIC STABILITY: TRANSPORTATION INSECURITY
IN THE PAST 12 MONTHS, HAS LACK OF TRANSPORTATION KEPT YOU FROM MEETINGS, WORK, OR FROM GETTING THINGS NEEDED FOR DAILY LIVING?: NO

## 2021-12-09 SDOH — ECONOMIC STABILITY: FOOD INSECURITY: WITHIN THE PAST 12 MONTHS, THE FOOD YOU BOUGHT JUST DIDN'T LAST AND YOU DIDN'T HAVE MONEY TO GET MORE.: NEVER TRUE

## 2021-12-09 SDOH — ECONOMIC STABILITY: FOOD INSECURITY: WITHIN THE PAST 12 MONTHS, YOU WORRIED THAT YOUR FOOD WOULD RUN OUT BEFORE YOU GOT MONEY TO BUY MORE.: NEVER TRUE

## 2021-12-09 ASSESSMENT — ENCOUNTER SYMPTOMS
EYE REDNESS: 0
WHEEZING: 0
EYE PAIN: 0
SHORTNESS OF BREATH: 0
ABDOMINAL DISTENTION: 0
BACK PAIN: 0
SORE THROAT: 0
SINUS PRESSURE: 0
DIARRHEA: 0
COUGH: 0
NAUSEA: 0
EYE DISCHARGE: 0
SINUS PAIN: 0
ABDOMINAL PAIN: 0
VOMITING: 0

## 2021-12-09 ASSESSMENT — PATIENT HEALTH QUESTIONNAIRE - PHQ9
SUM OF ALL RESPONSES TO PHQ QUESTIONS 1-9: 0
SUM OF ALL RESPONSES TO PHQ QUESTIONS 1-9: 0
1. LITTLE INTEREST OR PLEASURE IN DOING THINGS: 0
SUM OF ALL RESPONSES TO PHQ QUESTIONS 1-9: 0
SUM OF ALL RESPONSES TO PHQ9 QUESTIONS 1 & 2: 0
2. FEELING DOWN, DEPRESSED OR HOPELESS: 0

## 2021-12-09 ASSESSMENT — SOCIAL DETERMINANTS OF HEALTH (SDOH): HOW HARD IS IT FOR YOU TO PAY FOR THE VERY BASICS LIKE FOOD, HOUSING, MEDICAL CARE, AND HEATING?: NOT HARD AT ALL

## 2021-12-09 NOTE — PATIENT INSTRUCTIONS
Patient Education        methylprednisolone (oral)  Pronunciation:  METH il pred NIS oh lone  Brand:  Medrol, Medrol Dosepak, MethylPREDNISolone Dose Pack  What is the most important information I should know about methylprednisolone? You should not use this medicine if you have a fungal infection anywhere in your body. What is methylprednisolone? Methylprednisolone is a steroid that prevents the release of substances in the body that cause inflammation. Methylprednisolone is used to treat many different inflammatory conditions such as arthritis, lupus, psoriasis, ulcerative colitis, allergic disorders, gland (endocrine) disorders, and conditions that affect the skin, eyes, lungs, stomach, nervous system, or blood cells. Methylprednisolone may also be used for purposes not listed in this medication guide. What should I discuss with my healthcare provider before taking methylprednisolone? You should not use methylprednisolone if you are allergic to it, or if you have:  · a fungal infection anywhere in your body. Methylprednisolone can weaken your immune system, making it easier for you to get an infection. Steroids can also worsen an infection you already have, or reactivate an infection you recently had. Tell your doctor about any illness or infection you have had within the past several weeks. To make sure methylprednisolone is safe for you, tell your doctor if you have ever had:  · a thyroid disorder;  · herpes infection of the eyes;  · stomach ulcers, ulcerative colitis, or diverticulitis;  · depression, mental illness, or psychosis;  · liver disease (especially cirrhosis);  · high blood pressure;  · osteoporosis;  · a muscle disorder such as myasthenia gravis; or  · multiple sclerosis. Also tell your doctor if you have diabetes. Steroid medicines may increase the glucose (sugar) levels in your blood or urine. You may also need to adjust the dose of your diabetes medications.   It is not known whether this medicine will harm an unborn baby. Tell your doctor if you are pregnant or plan to become pregnant. It is not known whether methylprednisolone passes into breast milk or if it could affect the nursing baby. Tell your doctor if you are breast-feeding. How should I take methylprednisolone? Follow all directions on your prescription label. Your doctor may occasionally change your dose. Do not use this medicine in larger or smaller amounts or for longer than recommended. Methylprednisolone is sometimes taken every other day. Follow your doctor's dosing instructions very carefully. Your dose needs may change if you have unusual stress such as a serious illness, fever or infection, or if you have surgery or a medical emergency. Tell your doctor about any such situation that affects you. This medicine can cause unusual results with certain medical tests. Tell any doctor who treats you that you are using methylprednisolone. You should not stop using methylprednisolone suddenly. Follow your doctor's instructions about tapering your dose. Wear a medical alert tag or carry an ID card stating that you take methylprednisolone. Any medical care provider who treats you should know that you take steroid medication. If you need surgery, tell the surgeon ahead of time that you are using methylprednisolone. You may need to stop using the medicine for a short time. Store at room temperature away from moisture and heat. What happens if I miss a dose? Call your doctor for instructions if you miss a dose of methylprednisolone. What happens if I overdose? Seek emergency medical attention or call the Poison Help line at 1-903.940.9546. An overdose of methylprednisolone is not expected to produce life threatening symptoms.  However, long term use of high steroid doses can lead to symptoms such as thinning skin, easy bruising, changes in the shape or location of body fat (especially in your face, neck, back, and waist), increased acne or facial hair, menstrual problems, impotence, or loss of interest in sex. What should I avoid while taking methylprednisolone? Avoid being near people who are sick or have infections. Call your doctor for preventive treatment if you are exposed to chicken pox or measles. These conditions can be serious or even fatal in people who are using steroid medication. Do not receive a \"live\" vaccine while using methylprednisolone. The vaccine may not work as well during this time, and may not fully protect you from disease. Live vaccines include measles, mumps, rubella (MMR), polio, rotavirus, typhoid, yellow fever, varicella (chickenpox), zoster (shingles), and nasal flu (influenza) vaccine. What are the possible side effects of methylprednisolone? Get emergency medical help if you have signs of an allergic reaction: hives; difficult breathing; swelling of your face, lips, tongue, or throat. Call your doctor at once if you have:  · shortness of breath (even with mild exertion), swelling, rapid weight gain;  · bruising, thinning skin, or any wound that will not heal;  · blurred vision, tunnel vision, eye pain, or seeing halos around lights;  · severe depression, changes in personality, unusual thoughts or behavior;  · new or unusual pain in an arm or leg or in your back;  · bloody or tarry stools, coughing up blood or vomit that looks like coffee grounds;  · seizure (convulsions); or  · low potassium --leg cramps, constipation, irregular heartbeats, fluttering in your chest, increased thirst or urination, numbness or tingling. Steroids can affect growth in children. Tell your doctor if your child is not growing at a normal rate while using this medicine. Common side effects may include:  · fluid retention (swelling in your hands or ankles);  · dizziness, spinning sensation;  · changes in your menstrual periods;  · headache;  · mild muscle pain or weakness; or  · stomach discomfort, bloating.   This is not a complete list of side effects and others may occur. Call your doctor for medical advice about side effects. You may report side effects to FDA at 3-901-CYR-1062. What other drugs will affect methylprednisolone? Other drugs may interact with methylprednisolone, including prescription and over-the-counter medicines, vitamins, and herbal products. Tell each of your health care providers about all medicines you use now and any medicine you start or stop using. Where can I get more information? Your pharmacist can provide more information about methylprednisolone. Remember, keep this and all other medicines out of the reach of children, never share your medicines with others, and use this medication only for the indication prescribed. Every effort has been made to ensure that the information provided by [address] is accurate, up-to-date, and complete, but no guarantee is made to that effect. Drug information contained herein may be time sensitive. [employer] information has been compiled for use by healthcare practitioners and consumers in the [place] and therefore [employer] does not warrant that uses outside of the [place] are appropriate, unless specifically indicated otherwise. [facility] drug information does not endorse drugs, diagnose patients or recommend therapy. [facility] drug information is an informational resource designed to assist licensed healthcare practitioners in caring for their patients and/or to serve consumers viewing this service as a supplement to, and not a substitute for, the expertise, skill, knowledge and judgment of healthcare practitioners. The absence of a warning for a given drug or drug combination in no way should be construed to indicate that the drug or drug combination is safe, effective or appropriate for any given patient.  [facility] does not assume any responsibility for any aspect of healthcare administered with the aid of information [facility] provides. The information contained herein is not intended to cover all possible uses, directions, precautions, warnings, drug interactions, allergic reactions, or adverse effects. If you have questions about the drugs you are taking, check with your doctor, nurse or pharmacist.  Copyright 9462-3584 [address]. Version: 9.01. Revision date: 8/30/2017. Care instructions adapted under license by [facility]. If you have questions about a medical condition or this instruction, always ask your healthcare professional. [place] [address] any warranty or liability for your use of this information.

## 2021-12-09 NOTE — PROGRESS NOTES
Ragini Moffett (:  1960) is a 57 y.o. male,Established patient, here for evaluation of the following chief complaint(s):  New Patient ([place] 10 YEARS AGO )      ASSESSMENT/PLAN:  1. Left sided sciatica  -Presentation is consistent with left-sided sciatica. Origin could be lower back versus left hip. X-ray to further evaluate. Medrol Dosepak is being sent to the pharmacy. Educated on the medication use as well as side effects. Follow-up as needed if no improvement. -     methylPREDNISolone (MEDROL DOSEPACK) 4 MG tablet; Take by mouth., Disp-1 kit, R-0Normal  -     XR LUMBAR SPINE (2-3 VIEWS); Future  -     XR HIP LEFT (2-3 VIEWS); Future  2. Encounter to establish care  -The patient's medical, surgical, social, and family history were reviewed with the patient. Medications were reconciled. -Follow-up in 6 months, or sooner if needed  3. Paroxysmal atrial fibrillation (HCC)  -Regular in office today. Follows with cardiology. Defer management to them. 4. Coronary artery disease involving native coronary artery of native heart without angina pectoris  -Follows cardiology. Defer management to them. Return in about 6 months (around 2022) for Follow-up Hypertension/Care Gaps. SUBJECTIVE/OBJECTIVE:  HPI Doree Landau presents today to establish care as well as be evaluated for sciatica. He states he has been having pain she now is left leg since . He went to the emergency room at that time. Was diagnosed with sciatica. There was no imaging performed in the emergency room. Patient states that he went to do a twisting motion while working and suddenly felt a stabbing pain down his leg. No known trauma. 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 Denies any numbness or tingling. States that the pain starts in his left buttock extends to his groin, and goes into the thigh.   He was given Toradol in the emergency room and was discharged with Norco and a muscle relaxer. He states he has not taken the Norco whatsoever as he would like to avoid pain pills. Is only taking the muscle relaxer twice but he feels it may have helped a little bit. At he has a history of CABG in March. Was initially admitted to [facility]. Intubated at that time. Post cath was found to have significant blockage. Was transferred to [facility] and then [facility] for procedure. Continues to follow with cardiology at [facility].  Has a history of A. fib as well for which she is on Coumadin. Cardiology manages all of his medications. Denies any chest pain, dizziness, or shortness of breath. Review of Systems   Constitutional: Negative for chills and fever. HENT: Negative for ear discharge, ear pain, hearing loss, sinus pressure, sinus pain and sore throat. Eyes: Negative for pain, discharge and redness. Respiratory: Negative for cough, shortness of breath and wheezing. Cardiovascular: Negative for chest pain and palpitations. Gastrointestinal: Negative for abdominal distention, abdominal pain, diarrhea, nausea and vomiting. Genitourinary: Negative for dysuria and hematuria. Musculoskeletal: Positive for arthralgias and gait problem. Negative for back pain, joint swelling, myalgias, neck pain and neck stiffness. Skin: Negative for rash. Neurological: Positive for weakness. Negative for dizziness, light-headedness, numbness and headaches. Left sciatica   Psychiatric/Behavioral: Negative for decreased concentration, dysphoric mood and sleep disturbance. The patient is not nervous/anxious. Physical Exam  Vitals reviewed. Constitutional:       General: He is not in acute distress. Appearance: Normal appearance. He is normal weight. HENT:      Head: Normocephalic and atraumatic.       Right Ear: Tympanic membrane, ear canal and external ear normal.      Left Ear: Tympanic membrane, ear canal and external ear normal.      Nose: Nose normal.      Mouth/Throat:      Mouth: Mucous membranes are moist.      Pharynx: Oropharynx is clear. No posterior oropharyngeal erythema. Eyes:      General: No scleral icterus. Right eye: No discharge. Left eye: No discharge. Extraocular Movements: Extraocular movements intact. Pupils: Pupils are equal, round, and reactive to light. Cardiovascular:      Rate and Rhythm: Normal rate and regular rhythm. Pulses: Normal pulses. Heart sounds: Murmur heard. No gallop. Comments: Grade 2 systolic murmur  Pulmonary:      Effort: Pulmonary effort is normal.      Breath sounds: Normal breath sounds. No wheezing. Abdominal:      General: Bowel sounds are normal.      Palpations: Abdomen is soft. Tenderness: There is no abdominal tenderness. There is no guarding or rebound. Musculoskeletal:         General: Tenderness present. No swelling. Normal range of motion. Cervical back: Normal range of motion and neck supple. Comments: Mild tenderness over the left posterior hip   Skin:     General: Skin is warm and dry. Capillary Refill: Capillary refill takes less than 2 seconds. Neurological:      Mental Status: He is alert and oriented to person, place, and time. Mental status is at baseline. Psychiatric:         Mood and Affect: Mood normal.         Behavior: Behavior normal.         Thought Content: Thought content normal.         Judgment: Judgment normal.               This dictation was generated by voice recognition computer software. Although all attempts are made to edit the dictation for accuracy, there may be errors in the transcription that are not intended. An electronic signature was used to authenticate this note.     --Kit Chaudhry, APRN - CNP

## 2021-12-10 ENCOUNTER — HOSPITAL ENCOUNTER (OUTPATIENT)
Dept: GENERAL RADIOLOGY | Age: 61
Discharge: HOME | End: 2021-12-10
Payer: COMMERCIAL

## 2021-12-10 ENCOUNTER — HOSPITAL ENCOUNTER (OUTPATIENT)
Age: 61
Discharge: HOME | End: 2021-12-10
Payer: COMMERCIAL

## 2021-12-10 DIAGNOSIS — M54.32 LEFT SIDED SCIATICA: ICD-10-CM

## 2021-12-10 PROCEDURE — 72100 X-RAY EXAM L-S SPINE 2/3 VWS: CPT

## 2021-12-10 PROCEDURE — 73502 X-RAY EXAM HIP UNI 2-3 VIEWS: CPT

## 2021-12-15 ENCOUNTER — TELEPHONE (OUTPATIENT)
Dept: FAMILY MEDICINE CLINIC | Age: 61
End: 2021-12-15

## 2021-12-15 DIAGNOSIS — M16.12 PRIMARY OSTEOARTHRITIS OF LEFT HIP: Primary | ICD-10-CM

## 2021-12-15 DIAGNOSIS — M54.32 LEFT SIDED SCIATICA: ICD-10-CM

## 2021-12-15 NOTE — TELEPHONE ENCOUNTER
----- Message from Matteo Wright sent at 12/15/2021  4:09 PM EST -----  Subject: Results Request    QUESTIONS  Which lab or imaging result is the patient calling about? xray  Which provider ordered the test?   At what location was the test performed? Date the test was performed? Additional Information for Provider? Patient was expecting a call back   about the xrays he had done. He is requesting a call back  ---------------------------------------------------------------------------  --------------  CALL BACK INFO  What is the best way for the office to contact you? OK to leave message on   voicemail  Preferred Call Back Phone Number?  [phone]

## 2021-12-20 NOTE — PLAN OF CARE
[facility] [place]. [address]  Phone: [phone]   Fax:     [phone]                                                       Physical Therapy Certification    Dear Referring Practitioner: JUAN Cervantes,    We had the pleasure of evaluating the following patient for physical therapy services at [facility]. A summary of our findings can be found in the initial assessment below. This includes our plan of care. If you have any questions or concerns regarding these findings, please do not hesitate to contact me at the office phone number checked above. Thank you for the referral.       Physician Signature:_______________________________Date:__________________  By signing above (or electronic signature), therapists plan is approved by physician          Patient: Carrol Gustafson   : 1960   MRN: [id number]  Referring Physician: Referring Practitioner: JUAN Cervantes      Evaluation Date: 2021      Medical Diagnosis Information:  Diagnosis: M16.12 (ICD-10-CM) - Primary osteoarthritis of left hip   Treatment Diagnosis: Decreased mobility and strength. Insurance information: PT Insurance Information: [place]     Precautions/ Contra-indications: None. Latex Allergy:  [x]NO      []YES  Preferred Language for Healthcare:   [x]English       []other:    C-SSRS Triggered by Intake questionnaire (Past 2 wk assessment ):   [x] No, Questionnaire did not trigger screening.   [] Yes, Patient intake triggered C-SSRS Screening      [] C-SSRS Screening completed  [] PCP notified via Epic     SUBJECTIVE: Patient reports L hip pain. 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  Pain is located in L groin and lateral hip. Pt denies N/T. He received injections and a steroid pack which has helped decreased the pain. 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 He states overall his hip has gotten better and he is looking forward to doing PT. He states in the beginning his hip pain was so bad that he had to use crutches. He no longer using an assistive device. Pt reports some popping in the L hip and occassional pain in R knee. He states he feels like he is walking differently/compensating. In March 2021 he underwent open heart surgery (triple bypass). Relevant Medical History:Additional Pertinent Hx: HPL, HTN  Functional Outcome Measure: LEFS = 20, disability index 60-79%    Pain Scale: 3-4/10  Easing factors: injection, steroid pack, tylenol  Provocative factors: Prolonged sitting then transferring to standing, prolonged driving, stairs     Type: []Constant   [x]Intermittent  []Radiating []Localized []other:     Numbness/Tingling: Pt denies N/T. Occupation/School: Employed, full time. Living Status/Prior Level of Function: Independent with ADLs and IADLs    OBJECTIVE:     Posture: Normal    Functional Mobility/Transfers: independent. Palpation: Gr II TTP to L anterior, lateral, and posterior hip musculature.      Bandages/Dressings/Incisions: NA     Gait: decreased alyssa, decreased stride length, mild increase in lateral sway, increased toe out on R LE.     ROM LEFT RIGHT   Lumbar AROM WNL in all directions, without pain    HIP Flex 90 deg WNL   HIP IR Severe limitations Mild limitations   HIP ER Severe limitations  Mild limitations   Knee ext WNL WNL   Knee Flex WNL WNL   Strength  LEFT RIGHT   HIP Flexors 4-/5 discomfot 4/5   HIP Abductors 3+/5 4/5   HIP Ext 4-/5 4-/5   Knee EXT (quad) 5/5 5/5   Knee Flex (HS) 4-/5 4-/5   Ankle DF 5/5 5/5   Ankle PF 5/5 5/5          Reflexes/Sensation:    [x]Dermatomes/Myotomes intact    [x]Reflexes equal and normal bilaterally   []Other:    Joint mobility: L femoral-acetabular joint    []Normal    [x]Hypo   []Hyper    Orthopedic Special Tests:    Scour: + L, - R   MELVA: + L, - R                        [x] Patient history, allergies, meds reviewed. Medical chart reviewed. See intake form. Review Of Systems (ROS):  [x]Performed Review of systems (Integumentary, CardioPulmonary, Neurological) by intake and observation. Intake form has been scanned into medical record. Patient has been instructed to contact their primary care physician regarding ROS issues if not already being addressed at this time.       Co-morbidities/Complexities (which will affect course of rehabilitation):   []None           Arthritic conditions   []Rheumatoid arthritis (M05.9)  []Osteoarthritis (M19.91)   Cardiovascular conditions   [x]Hypertension (I10)  [x]Hyperlipidemia (E78.5)  []Angina pectoris (I20)  []Atherosclerosis (I70)  []CVA Musculoskeletal conditions   []Disc pathology   []Congenital spine pathologies   []Prior surgical intervention  []Osteoporosis (M81.8)  []Osteopenia (M85.8)   Endocrine conditions   []Hypothyroid (E03.9)  []Hyperthyroid Gastrointestinal conditions   []Constipation (K40.27)   Metabolic conditions   []Morbid obesity (E66.01)  []Diabetes type 1(E10.65) or 2 (E11.65)   []Neuropathy (G60.9)     Pulmonary conditions   []Asthma (J45)  []Coughing   []COPD (J44.9)   Psychological Disorders  []Anxiety (F41.9)  []Depression (F32.9)   []Other:   []Other:          Barriers to/and or personal factors that will affect rehab potential:              []Age  []Sex    []Smoker              []Motivation/Lack of Motivation                        [x]Co-Morbidities              []Cognitive Function, education/learning barriers              []Environmental, home barriers              []profession/work barriers  []past PT/medical experience  []other:  Justification:     Falls Risk Assessment (30 days):   [x] Falls Risk assessed and no intervention required. [] Falls Risk assessed and Patient requires intervention due to being higher risk   TUG score (>12s at risk):     [] Falls education provided, including         ASSESSMENT: Pt is a plesant 56 yo male who presents to PT with L hip pain. Signs and symptoms consistent with a mobility deficit secondary to OA. Pt demonstrates good tolerance to PT IE. Upon PT IE, pt demonstrates decreased/painful ROM, decreased LE/glute strength, flexibility impairments, and gait/balance deficits. Pt would benefit from PT 2x/week for 4 - 6 weeks in order to address the impairments listed and return pt to PLOF. Functional Impairments:     [x]Noted lumbar/proximal hip/LE hypomobility   []Decreased LE functional ROM   [x]Decreased core/proximal hip strength and neuromuscular control   [x]Decreased LE functional strength   [x]Reduced balance/proprioceptive control   []other:      Functional Activity Limitations (from functional questionnaire and intake)   [x]Reduced ability to tolerate prolonged functional positions   [x]Reduced ability or difficulty with changes of positions or transfers between positions   [x]Reduced ability to maintain good posture and demonstrate good body mechanics with sitting, bending, and lifting   []Reduced ability to sleep   [x] Reduced ability or tolerance with driving and/or computer work   [x]Reduced ability to perform lifting, carrying tasks   [x]Reduced ability to squat   []Reduced ability to forward bend   [x]Reduced ability to ambulate prolonged functional periods/distances/surfaces   [x]Reduced ability to ascend/descend stairs   [x]Reduced ability to run, hop or jump   []other:     Participation Restrictions   []Reduced participation in self care activities   [x]Reduced participation in home management activities   [x]Reduced participation in work activities   [x]Reduced participation in social activities.    [x]Reduced participation in sport activities. Classification :    []Signs/symptoms consistent with post-surgical status including decreased ROM, strength and function. []Signs/symptoms consistent with joint sprain/strain   []Signs/symptoms consistent with patella-femoral syndrome   [x]Signs/symptoms consistent with knee OA/hip OA   []Signs/symptoms consistent with internal derangement of knee/Hip   []Signs/symptoms consistent with functional hip weakness/NMR control      []Signs/symptoms consistent with tendinitis/tendinosis    []signs/symptoms consistent with pathology which may benefit from Dry needling      []other:      Prognosis/Rehab Potential:      []Excellent   [x]Good    []Fair   []Poor    Tolerance of evaluation/treatment:    []Excellent   [x]Good    []Fair   []Poor    Physical Therapy Evaluation Complexity Justification  [x] A history of present problem with:  [] no personal factors and/or comorbidities that impact the plan of care;  [x]1-2 personal factors and/or comorbidities that impact the plan of care  []3 personal factors and/or comorbidities that impact the plan of care  [x] An examination of body systems using standardized tests and measures addressing any of the following: body structures and functions (impairments), activity limitations, and/or participation restrictions;:  [x] a total of 1-2 or more elements   [] a total of 3 or more elements   [] a total of 4 or more elements   [x] A clinical presentation with:  [x] stable and/or uncomplicated characteristics   [] evolving clinical presentation with changing characteristics  [] unstable and unpredictable characteristics;   [x] Clinical decision making of [x] low, [] moderate, [] high complexity using standardized patient assessment instrument and/or measurable assessment of functional outcome.     [x] EVAL (LOW) 35027 (typically 20 minutes face-to-face)  [] EVAL (MOD) 13926 (typically 30 minutes face-to-face)  [] EVAL (HIGH) 07348 (typically 45 minutes face-to-face)  [] RE-EVAL     PLAN:  Frequency/Duration:  2 days per week for 4-6 Weeks:  Interventions:  [x]  Therapeutic exercise including: strength training, ROM, for Lower extremity and core   [x]  NMR activation and proprioception for LE, Glutes and Core   [x]  Manual therapy as indicated for LE, Hip and spine to include: Dry Needling/IASTM, STM, PROM, Gr I-IV mobilizations, manipulation. [x] Modalities as needed that may include: thermal agents, E-stim, Biofeedback, US, iontophoresis as indicated  [x] Patient education on joint protection, postural re-education, activity modification, progression of HEP. HEP instruction:  Access Code: [email or web address]  URL: [email or web address]  Date: 12/21/2021  Prepared by: Sanford Desir    Exercises  Bent Knee Fallouts - 1 x daily - 7 x weekly - 2 sets - 10 reps  Supine Straight Leg Raises - 1 x daily - 7 x weekly - 2 sets - 10 reps  Seated Table Hamstring Stretch - 1 x daily - 7 x weekly - 3 sets - 30 hold  Seated Long Arc Quad - 1 x daily - 7 x weekly - 2 sets - 10 reps      GOALS:  Patient stated goal: \"To be able to walk (my dog) and sit with no pain. \"   [] Progressing: [] Met: [] Not Met: [] Adjusted    Therapist goals for Patient:   Short Term Goals: To be achieved in: 2 weeks  1. Independent in HEP and progression per patient tolerance, in order to prevent re-injury. [] Progressing: [] Met: [] Not Met: [] Adjusted  2. Patient will have a decrease in pain to facilitate improvement in movement, function, and ADLs as indicated by Functional Deficits. [] Progressing: [] Met: [] Not Met: [] Adjusted    Long Term Goals: To be achieved in: 6 weeks  1. A score of 35 or higher on the LEFS to assist with reaching prior level of function. [] Progressing: [] Met: [] Not Met: [] Adjusted  2. Patient will demonstrate increased L hip flexion AROM to 100 deg without pain to allow for proper joint functioning as indicated by patients Functional Deficits.    [] Progressing: [] Met: [] Not Met: [] Adjusted  3. Patient will demonstrate an increase in glute Strength to 4/5 to allow for proper functional mobility as indicated by patients Functional Deficits. [] Progressing: [] Met: [] Not Met: [] Adjusted  4. Patient will return to walking 1 mile without increased symptoms or restriction. [] Progressing: [] Met: [] Not Met: [] Adjusted  5. Patient will be able to tolerate sitting for 1 hour without increased pain/syptoms upon transferring to standing. [] Progressing: [] Met: [] Not Met: [] Adjusted     Electronically signed by:  Angle Larsen PT      Note: If patient does not return for scheduled/recommended follow up visits, this note will serve as a discharge from care along with the most recent update on progress.

## 2021-12-20 NOTE — FLOWSHEET NOTE
[facility]  [address] [facility] [place]. [address] [address]  Phone: [phone]   Fax:     [phone]      Physical Therapy Treatment Note/ Progress Report:     Date:  2021    Patient Name:  Lety Vang    :  1960  MRN: [id number]    Pertinent Medical History:Additional Pertinent Hx: HPL, HTN    Medical/Treatment Diagnosis Information:  · Diagnosis: M16.12 (ICD-10-CM) - Primary osteoarthritis of left hip  · Treatment Diagnosis: Decreased mobility and strength. Insurance/Certification information:  PT Insurance Information: [place]  Physician Information:  Referring Practitioner: JUAN Somers  Plan of care signed (Y/N): faxed on 21    Date of Patient follow up with Physician:      Progress Report: []  Yes  [x]  No     Date Range for reporting period:  Beginnin2021  Ending:      Progress report due (10 Rx/or 30 days whichever is less):      Recertification due (POC duration/ or 90 days whichever is less):      Visit # POC/Insurance Allowable Auth Needed   1 30 visits pcy  (2 visits used to date) []Yes   []No     Latex Allergy:  [x]NO      []YES  Preferred Language for Healthcare:   [x]English       []Other:    Functional Scale:      Date assessed: at eval (21)  Test: LEFS  Score: 20    Pain level:  3-4/10     History of Injury: Patient reports L hip pain. 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 Pain is located in L groin and lateral hip. Pt denies N/T. He received injections and a steroid pack which has helped decreased the pain. 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 He states overall his hip has gotten better and he is looking forward to doing PT.  He states in the beginning his hip pain was so bad that he had to use crutches. He no longer using an assistive device. Pt reports some popping in the L hip and occassional pain in R knee. He states he feels like he is walking differently/compensating. In March 2021 he underwent open heart surgery (triple bypass). SUBJECTIVE:  See eval    OBJECTIVE:   Observation:    Test measurements:      RESTRICTIONS/PRECAUTIONS: None     Exercises/Interventions:     Therapeutic Ex (66070)   Min: Reps/Resistance Notes/CUES   BKFO  SLR  Seated Hamstring Str  LAQ  HEP                                 Manual Intervention (58281)  Min:     Knee mobs/PROM     Tib/Fem Mobs     Patella Mobs     Ankle mobs               NMR re-education (61527)  Min:  CUES NEEDED             Therapeutic Activity (58458)  Min:     Pt education on functional anatomy and pathology and PT POC. Modalities  Min:     IFC with      CP after exercises     MH after exercises            Other Therapeutic Activities: Pt was educated on PT POC, Diagnosis, Prognosis, pathomechanics as well as frequency and duration of scheduling future physical therapy appointments. Time was also taken on this day to answer all patient questions and participation in PT. Reviewed appointment policy in detail with patient and patient verbalized understanding. Home Exercise Program: Patient was instructed in the following for HEP:     . Patient verbalized/demonstrated understanding and was issued written handout.       Therapeutic Exercise and NMR EXR  [x] (40279) Provided verbal/tactile cueing for activities related to strengthening, flexibility, endurance, ROM for improvements in LE, proximal hip, and core control with self care, mobility, lifting, ambulation.  [] (10439) Provided verbal/tactile cueing for activities related to improving balance, coordination, kinesthetic sense, posture, motor skill, proprioception  to assist with LE, proximal hip, and core control in self care, mobility, lifting, ambulation and eccentric single leg control. NMR and Therapeutic Activities:    [] (51370 or 51821) Provided verbal/tactile cueing for activities related to improving balance, coordination, kinesthetic sense, posture, motor skill, proprioception and motor activation to allow for proper function of core, proximal hip and LE with self care and ADLs and functional mobility.   [] (53477) Gait Re-education- Provided training and instruction to the patient for proper LE, core and proximal hip recruitment and positioning and eccentric body weight control with ambulation re-education including up and down stairs     Home Exercise Program:    [x] (76089) Reviewed/Progressed HEP activities related to strengthening, flexibility, endurance, ROM of core, proximal hip and LE for functional self-care, mobility, lifting and ambulation/stair navigation   [] (16553)Reviewed/Progressed HEP activities related to improving balance, coordination, kinesthetic sense, posture, motor skill, proprioception of core, proximal hip and LE for self care, mobility, lifting, and ambulation/stair navigation      Manual Treatments:  PROM / STM / Oscillations-Mobs:  G-I, II, III, IV (PA's, Inf., Post.)  [] (83112) Provided manual therapy to mobilize LE, proximal hip and/or LS spine soft tissue/joints for the purpose of modulating pain, promoting relaxation,  increasing ROM, reducing/eliminating soft tissue swelling/inflammation/restriction, improving soft tissue extensibility and allowing for proper ROM for normal function with self care, mobility, lifting and ambulation.        Charges:  Timed Code Treatment Minutes: 30   Total Treatment Minutes: 50      [x] EVAL (LOW) 31039 (typically 20 minutes face-to-face)  [] EVAL (MOD) 25336 (typically 30 minutes face-to-face)  [] EVAL (HIGH) 86102 (typically 45 minutes face-to-face)  [] RE-EVAL     [x] KE(17844) x     [] Dry needle 1 or 2 Muscles (55500)  [] NMR (17452) x     [] Dry needle 3+ Functional goals/ Treatment Progress Update:  [] Patient is progressing as expected towards functional goals listed. [] Progression is slowed due to complexities/Impairments listed. [] Progression has been slowed due to co-morbidities. [x] Plan just implemented, too soon to assess goals progression <30days   [] Goals require adjustment due to lack of progress  [] Patient is not progressing as expected and requires additional follow up with physician  [] Other    Prognosis for POC: [x] Good [] Fair  [] Poor    Patient requires continued skilled intervention: [x] Yes  [] No        PLAN:   [] Continue per plan of care [] Alter current plan (see comments)  [x] Plan of care initiated [] Hold pending MD visit [] Discharge    Electronically signed by: Nadya Dobbins PT    Note: If patient does not return for scheduled/recommended follow up visits, this note will serve as a discharge from care along with the most recent update on progress.

## 2021-12-21 ENCOUNTER — HOSPITAL ENCOUNTER (OUTPATIENT)
Dept: PHYSICAL THERAPY | Age: 61
Setting detail: THERAPIES SERIES
Discharge: HOME | End: 2021-12-21
Payer: COMMERCIAL

## 2021-12-21 PROCEDURE — 97110 THERAPEUTIC EXERCISES: CPT

## 2021-12-21 PROCEDURE — 97161 PT EVAL LOW COMPLEX 20 MIN: CPT

## 2021-12-21 PROCEDURE — 97530 THERAPEUTIC ACTIVITIES: CPT

## 2021-12-23 ENCOUNTER — HOSPITAL ENCOUNTER (OUTPATIENT)
Dept: PHYSICAL THERAPY | Age: 61
Setting detail: THERAPIES SERIES
Discharge: HOME | End: 2021-12-23
Payer: COMMERCIAL

## 2021-12-23 PROCEDURE — 97110 THERAPEUTIC EXERCISES: CPT

## 2021-12-23 PROCEDURE — 97140 MANUAL THERAPY 1/> REGIONS: CPT

## 2021-12-23 NOTE — FLOWSHEET NOTE
[facility] - Outpatient Rehabilitation & Therapy  [address] [facility] ([facility]. [place] [address]  Phone: [phone]   Fax:     [phone]      Physical Therapy Treatment Note/ Progress Report:     Date:  2021    Patient Name:  Jericho Garcia    :  1960  MRN: [id number]    Pertinent Medical History:Additional Pertinent Hx: HPL, HTN    Medical/Treatment Diagnosis Information:  · Diagnosis: M16.12 (ICD-10-CM) - Primary osteoarthritis of left hip  · Treatment Diagnosis: Decreased mobility and strength. Insurance/Certification information:  PT Insurance Information: [facility]  Physician Information:  Referring Practitioner: JUAN Washington  Plan of care signed (Y/N): faxed on 21    Date of Patient follow up with Physician:      Progress Report: []  Yes  [x]  No     Date Range for reporting period:  Beginnin2021  Ending:      Progress report due (10 Rx/or 30 days whichever is less): 47     Recertification due (POC duration/ or 90 days whichever is less):      Visit # POC/Insurance Allowable Auth Needed   2 30 visits pcy  (2 visits used to date) []Yes   []No     Latex Allergy:  [x]NO      []YES  Preferred Language for Healthcare:   [x]English       []Other:    Functional Scale:      Date assessed: at [facility] (21)  Test: LEFS  Score: 20    Pain level:  4/10     History of Injury: Patient reports L hip pain. 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 Pain is located in L groin and lateral hip. Pt denies N/T. He received injections and a steroid pack which has helped decreased the pain. 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 He states overall his hip has gotten better and he is looking forward to doing PT.  He states in the beginning his hip pain was so bad that he had to use crutches. He no longer using an assistive device. Pt reports some popping in the L hip and occassional pain in R knee. He states he feels like he is walking differently/compensating. In March 2021 he underwent open heart surgery (triple bypass). SUBJECTIVE:  12/23/21: Patient reports groin area has been a little more sore in a groin area. States pain is the worst after he drives in his [place] and has to get out. OBJECTIVE:   Observation:    Test measurements:      RESTRICTIONS/PRECAUTIONS: None     Exercises/Interventions:     Therapeutic Ex (59258)   Min: 25 min Reps/Resistance Notes/CUES   BKFO  SLR  Seated Hamstring Str  LAQ  12/23/21 reviewed HEP   clamshells  2 x 10    bridges 5 sec x 10                        Manual Intervention (30719)  Min:15 min     Knee mobs/PROM     Tib/Fem Mobs     Patella Mobs     Ankle mobs     Hip mobs LAD mobs gr 2 ,PROM left hip to tolerance,STM to glutes,TFL,quad with a rollerstick         NMR re-education (95839)  Min:  CUES NEEDED             Therapeutic Activity (09015)  Min:     Pt education on functional anatomy and pathology and PT POC. Modalities  Min:     IFC with      CP after exercises     MH after exercises            Other Therapeutic Activities: Pt was educated on PT POC, Diagnosis, Prognosis, pathomechanics as well as frequency and duration of scheduling future physical therapy appointments. Time was also taken on this day to answer all patient questions and participation in PT. Reviewed appointment policy in detail with patient and patient verbalized understanding. Home Exercise Program: Patient was instructed in the following for HEP:     . Patient verbalized/demonstrated understanding and was issued written handout.       Therapeutic Exercise and NMR EXR  [x] (40950) Provided verbal/tactile cueing for activities related to strengthening, flexibility, endurance, ROM for improvements in LE, proximal hip, and core control with self care, mobility, lifting, ambulation.  [] (92957) Provided verbal/tactile cueing for activities related to improving balance, coordination, kinesthetic sense, posture, motor skill, proprioception  to assist with LE, proximal hip, and core control in self care, mobility, lifting, ambulation and eccentric single leg control. NMR and Therapeutic Activities:    [] (43995 or 07740) Provided verbal/tactile cueing for activities related to improving balance, coordination, kinesthetic sense, posture, motor skill, proprioception and motor activation to allow for proper function of core, proximal hip and LE with self care and ADLs and functional mobility.   [] (19571) Gait Re-education- Provided training and instruction to the patient for proper LE, core and proximal hip recruitment and positioning and eccentric body weight control with ambulation re-education including up and down stairs     Home Exercise Program:    [x] (72968) Reviewed/Progressed HEP activities related to strengthening, flexibility, endurance, ROM of core, proximal hip and LE for functional self-care, mobility, lifting and ambulation/stair navigation   [] (84896)Reviewed/Progressed HEP activities related to improving balance, coordination, kinesthetic sense, posture, motor skill, proprioception of core, proximal hip and LE for self care, mobility, lifting, and ambulation/stair navigation      Manual Treatments:  PROM / STM / Oscillations-Mobs:  G-I, II, III, IV (PA's, Inf., Post.)  [] (64204) Provided manual therapy to mobilize LE, proximal hip and/or LS spine soft tissue/joints for the purpose of modulating pain, promoting relaxation,  increasing ROM, reducing/eliminating soft tissue swelling/inflammation/restriction, improving soft tissue extensibility and allowing for proper ROM for normal function with self care, mobility, lifting and ambulation.        Charges:  Timed Code Treatment Minutes: 40   Total Treatment Minutes: 40      [] EVAL (LOW) 68740 (typically 20 minutes face-to-face)  [] EVAL (MOD) 05744 (typically 30 minutes face-to-face)  [] EVAL (HIGH) 78584 (typically 45 minutes face-to-face)  [] RE-EVAL     [x] ZA(61760) x  2   [] Dry needle 1 or 2 Muscles (82619)  [] NMR (21316) x     [] Dry needle 3+ Muscles (37688)  [x] Manual (38924) x     [] Ultrasound (84522) x  [] TA (36735) x     [] Mech Traction (47888)  [] ES(attended) (21815)     [] ES (un) (52954):   [] Vasopump (53465) [] Ionto (97931)   [] Other:    GOALS:  Patient stated goal: \"To be able to walk (my dog) and sit with no pain. \"   []? Progressing: []? Met: []? Not Met: []? Adjusted     Therapist goals for Patient:   Short Term Goals: To be achieved in: 2 weeks  1. Independent in HEP and progression per patient tolerance, in order to prevent re-injury. []? Progressing: []? Met: []? Not Met: []? Adjusted  2. Patient will have a decrease in pain to facilitate improvement in movement, function, and ADLs as indicated by Functional Deficits. []? Progressing: []? Met: []? Not Met: []? Adjusted     Long Term Goals: To be achieved in: 6 weeks  1. A score of 35 or higher on the LEFS to assist with reaching prior level of function. []? Progressing: []? Met: []? Not Met: []? Adjusted  2. Patient will demonstrate increased L hip flexion AROM to 100 deg without pain to allow for proper joint functioning as indicated by patients Functional Deficits. []? Progressing: []? Met: []? Not Met: []? Adjusted  3. Patient will demonstrate an increase in glute Strength to 4/5 to allow for proper functional mobility as indicated by patients Functional Deficits. []? Progressing: []? Met: []? Not Met: []? Adjusted  4. Patient will return to walking 1 mile without increased symptoms or restriction. []? Progressing: []? Met: []? Not Met: []? Adjusted  5. Patient will be able to tolerate sitting for 1 hour without increased pain/syptoms upon transferring to standing.    []?

## 2021-12-29 ENCOUNTER — HOSPITAL ENCOUNTER (OUTPATIENT)
Dept: PHYSICAL THERAPY | Age: 61
Setting detail: THERAPIES SERIES
Discharge: HOME | End: 2021-12-29
Payer: COMMERCIAL

## 2021-12-29 PROCEDURE — 97140 MANUAL THERAPY 1/> REGIONS: CPT

## 2021-12-29 PROCEDURE — 97530 THERAPEUTIC ACTIVITIES: CPT

## 2021-12-29 PROCEDURE — 97110 THERAPEUTIC EXERCISES: CPT

## 2021-12-29 NOTE — FLOWSHEET NOTE
[facility] - Outpatient Rehabilitation & Therapy  [address] [facility]. [address] [place] [address] [place] [address]  Phone: [phone]   Fax:     [phone]      Physical Therapy Treatment Note/ Progress Report:     Date:  2021    Patient Name:  Saul Eagle    :  1960  MRN: [id number]    Pertinent Medical History:Additional Pertinent Hx: HPL, HTN    Medical/Treatment Diagnosis Information:  · Diagnosis: M16.12 (ICD-10-CM) - Primary osteoarthritis of left hip  · Treatment Diagnosis: Decreased mobility and strength. Insurance/Certification information:  PT Insurance Information: [facility]  Physician Information:  Referring Practitioner: Mavis Felty, APRN  Plan of care signed (Y/N): Signed on 21    Date of Patient follow up with Physician:      Progress Report: []  Yes  [x]  No     Date Range for reporting period:  Beginnin2021  Ending:      Progress report due (10 Rx/or 30 days whichever is less):      Recertification due (POC duration/ or 90 days whichever is less):       Visit # POC/Insurance Allowable Auth Needed   3 30 visits pcy  (2 visits used to date) []Yes   [x]No     Latex Allergy:  [x]NO      []YES  Preferred Language for Healthcare:   [x]English       []Other:    Functional Scale:      Date assessed: at [facility] (21)  Test: LEFS  Score: 20    Pain level:  Not assessed today/10     History of Injury: Patient reports L hip pain. 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 Pain is located in L groin and lateral hip. Pt denies N/T. He received injections and a steroid pack which has helped decreased the pain. 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 He states overall his hip has gotten better and he is looking forward to doing PT.  He states in the beginning his hip pain was so bad that he had to use crutches. He no longer using an assistive device. Pt reports some popping in the L hip and occassional pain in R knee. He states he feels like he is walking differently/compensating. In March 2021 he underwent open heart surgery (triple bypass). SUBJECTIVE:  12/23/21: Patient reports groin area has been a little more sore in a groin area. States pain is the worst after he drives in his [place] and has to get out. 12/29/21: Pt reports his hip overall is feeling better. He states he was sore after the PT initial eval, but his hip has been better since then. OBJECTIVE:   Observation:    Test measurements:      RESTRICTIONS/PRECAUTIONS: Pt occasionally experiences claustrophobia when laying flat/supine. Exercises/Interventions:     Therapeutic Ex (06155)   Min: 25 min Reps/Resistance Notes/CUES   BKFO  SLR  Seated Hamstring Str  LAQ 2x10  2x10  3x30 sec  2x10 HEP  Fair Valerio. HEP  Both sides    Clamshells  2 x 10 Both sides   Sidelying Hip Abd 2 x 10  Both Sides   Bridges 5 sec x 10, 2 sets    SAQ 2#, 2x15                   Manual Intervention (60213)  Min: 15 min     Knee mobs/PROM     Tib/Fem Mobs     Patella Mobs     Ankle mobs     Hip mobs LAD mobs gr 2 , PROM left hip to tolerance, STM to glutes, TFL, quad with a rollerstick         NMR re-education (60899)  Min:  CUES NEEDED             Therapeutic Activity (38963)  Min: 5 min     NuStep Lv2 x 5 min c handles   Mini Squats 2x10 Perform NV   Modalities  Min:     IFC with      CP after exercises     MH after exercises            Other Therapeutic Activities: Pt was educated on PT POC, Diagnosis, Prognosis, pathomechanics as well as frequency and duration of scheduling future physical therapy appointments. Time was also taken on this day to answer all patient questions and participation in PT. Reviewed appointment policy in detail with patient and patient verbalized understanding.      Home Exercise Program: Patient was instructed in the following for HEP:    Bent Knee Fallouts - 1 x daily - 7 x weekly - 2 sets - 10 reps  Supine Straight Leg Raises - 1 x daily - 7 x weekly - 2 sets - 10 reps  Seated Table Hamstring Stretch - 1 x daily - 7 x weekly - 3 sets - 30 hold  Seated Long Arc Quad - 1 x daily - 7 x weekly - 2 sets - 10 reps   Patient verbalized/demonstrated understanding and was issued written handout. Therapeutic Exercise and NMR EXR  [x] (96357) Provided verbal/tactile cueing for activities related to strengthening, flexibility, endurance, ROM for improvements in LE, proximal hip, and core control with self care, mobility, lifting, ambulation.  [] (46004) Provided verbal/tactile cueing for activities related to improving balance, coordination, kinesthetic sense, posture, motor skill, proprioception  to assist with LE, proximal hip, and core control in self care, mobility, lifting, ambulation and eccentric single leg control.      NMR and Therapeutic Activities:    [x] (85624 or 95246) Provided verbal/tactile cueing for activities related to improving balance, coordination, kinesthetic sense, posture, motor skill, proprioception and motor activation to allow for proper function of core, proximal hip and LE with self care and ADLs and functional mobility.   [] (10223) Gait Re-education- Provided training and instruction to the patient for proper LE, core and proximal hip recruitment and positioning and eccentric body weight control with ambulation re-education including up and down stairs     Home Exercise Program:    [x] (19323) Reviewed/Progressed HEP activities related to strengthening, flexibility, endurance, ROM of core, proximal hip and LE for functional self-care, mobility, lifting and ambulation/stair navigation   [] (39490)Reviewed/Progressed HEP activities related to improving balance, coordination, kinesthetic sense, posture, motor skill, proprioception of core, proximal hip and LE for self care, mobility, lifting, and ambulation/stair navigation      Manual Treatments:  PROM / STM / Oscillations-Mobs:  G-I, II, III, IV (PA's, Inf., Post.)  [x] (50885) Provided manual therapy to mobilize LE, proximal hip and/or LS spine soft tissue/joints for the purpose of modulating pain, promoting relaxation,  increasing ROM, reducing/eliminating soft tissue swelling/inflammation/restriction, improving soft tissue extensibility and allowing for proper ROM for normal function with self care, mobility, lifting and ambulation. Charges:  Timed Code Treatment Minutes: 40   Total Treatment Minutes: 40      [] EVAL (LOW) 72284 (typically 20 minutes face-to-face)  [] EVAL (MOD) 64894 (typically 30 minutes face-to-face)  [] EVAL (HIGH) 21394 (typically 45 minutes face-to-face)  [] RE-EVAL     [x] KP(66702) x  1   [] Dry needle 1 or 2 Muscles (62843)  [] NMR (21828) x     [] Dry needle 3+ Muscles (90971)  [x] Manual (84629) x  1   [] Ultrasound (96363) x  [x] TA (67250) x  1   [] Mech Traction (52645)  [] ES(attended) (34784)     [] ES (un) (18003):   [] Vasopump (34921) [] Ionto (04833)   [] Other:    GOALS:  Patient stated goal: \"To be able to walk (my dog) and sit with no pain. \"   []? Progressing: []? Met: []? Not Met: []? Adjusted     Therapist goals for Patient:   Short Term Goals: To be achieved in: 2 weeks  1. Independent in HEP and progression per patient tolerance, in order to prevent re-injury. []? Progressing: []? Met: []? Not Met: []? Adjusted  2. Patient will have a decrease in pain to facilitate improvement in movement, function, and ADLs as indicated by Functional Deficits. []? Progressing: []? Met: []? Not Met: []? Adjusted     Long Term Goals: To be achieved in: 6 weeks  1. A score of 35 or higher on the LEFS to assist with reaching prior level of function. []? Progressing: []? Met: []? Not Met: []? Adjusted  2.  Patient will demonstrate increased L hip flexion AROM to 100 deg without pain to allow for proper joint functioning as indicated by patients Functional Deficits. []? Progressing: []? Met: []? Not Met: []? Adjusted  3. Patient will demonstrate an increase in glute Strength to 4/5 to allow for proper functional mobility as indicated by patients Functional Deficits. []? Progressing: []? Met: []? Not Met: []? Adjusted  4. Patient will return to walking 1 mile without increased symptoms or restriction. []? Progressing: []? Met: []? Not Met: []? Adjusted  5. Patient will be able to tolerate sitting for 1 hour without increased pain/syptoms upon transferring to standing. []? Progressing: []? Met: []? Not Met: []? Adjusted            ASSESSMENT:  Tolerate rx well,very tight hip ER/IR,capsular stiffness. Some discomfort noted today with SLR on L side. Good tolerance to addition of glute med strengthening and NuStep bike. Continue to progress patient as tolerated. Treatment/Activity Tolerance:  [x] Patient tolerated treatment well [] Patient limited by fatique  [] Patient limited by pain  [] Patient limited by other medical complications  [] Other:     Overall Progression Towards Functional goals/ Treatment Progress Update:  [] Patient is progressing as expected towards functional goals listed. [] Progression is slowed due to complexities/Impairments listed. [] Progression has been slowed due to co-morbidities.   [x] Plan just implemented, too soon to assess goals progression <30days   [] Goals require adjustment due to lack of progress  [] Patient is not progressing as expected and requires additional follow up with physician  [] Other    Prognosis for POC: [x] Good [] Fair  [] Poor    Patient requires continued skilled intervention: [x] Yes  [] No        PLAN:   [] Continue per plan of care [] Alter current plan (see comments)  [x] Plan of care initiated [] Hold pending MD visit [] Discharge    Electronically signed by: Brenda Newman PT     Note: If patient does not return for scheduled/recommended follow up visits, this note will serve as a discharge from care along with the most recent update on progress.

## 2022-01-04 ENCOUNTER — HOSPITAL ENCOUNTER (OUTPATIENT)
Dept: PHYSICAL THERAPY | Age: 62
Setting detail: THERAPIES SERIES
Discharge: HOME | End: 2022-01-04
Payer: COMMERCIAL

## 2022-01-04 PROCEDURE — 97110 THERAPEUTIC EXERCISES: CPT

## 2022-01-04 PROCEDURE — 97140 MANUAL THERAPY 1/> REGIONS: CPT

## 2022-01-04 PROCEDURE — 97530 THERAPEUTIC ACTIVITIES: CPT

## 2022-01-04 NOTE — FLOWSHEET NOTE
[facility] - Outpatient Rehabilitation & Therapy  [address] [facility] ([facility]. Maninder Lyons  Phone: [phone]   Fax:     [phone]      Physical Therapy Treatment Note/ Progress Report:     Date:  2022    Patient Name:  Alivia Castaneda    :  1960  MRN: [id number]    Pertinent Medical History:Additional Pertinent Hx: HPL, HTN    Medical/Treatment Diagnosis Information:  · Diagnosis: M16.12 (ICD-10-CM) - Primary osteoarthritis of left hip  · Treatment Diagnosis: Decreased mobility and strength. Insurance/Certification information:  PT Insurance Information: [address] [place], [place] [address]  Physician Information:  Referring Practitioner: JUAN Page  Plan of care signed (Y/N): Signed on 21    Date of Patient follow up with Physician:      Progress Report: []  Yes  [x]  No     Date Range for reporting period:  Beginnin2021  Ending:      Progress report due (10 Rx/or 30 days whichever is less): 16     Recertification due (POC duration/ or 90 days whichever is less):       Visit # POC/Insurance Allowable Auth Needed   4 30 visits pcy  (2 visits used to date) []Yes   [x]No     Latex Allergy:  [x]NO      []YES  Preferred Language for Healthcare:   [x]English       []Other:    Functional Scale:      Date assessed: at eval (21)  Test: LEFS  Score: 20    Pain level:  Not assessed today/10     History of Injury: Patient reports L hip pain. 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 Pain is located in L groin and lateral hip. Pt denies N/T. He received injections and a steroid pack which has helped decreased the pain. 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 He states overall his hip has gotten better and he is looking forward to doing PT.  He states in the beginning his hip pain was so bad that he had to use crutches. He no longer using an assistive device. Pt reports some popping in the L hip and occassional pain in R knee. He states he feels like he is walking differently/compensating. In March 2021 he underwent open heart surgery (triple bypass). SUBJECTIVE:  12/23/21: Patient reports groin area has been a little more sore in a groin area. States pain is the worst after he drives in his [employer] and has to get out. 12/29/21: Pt reports his hip overall is feeling better. He states he was sore after the PT initial eval, but his hip has been better since then. 01/04/22: Patient reports hip is feeling looser,not as much soreness  as he gets out of the car. OBJECTIVE:   Observation:    Test measurements:      RESTRICTIONS/PRECAUTIONS: Pt occasionally experiences claustrophobia when laying flat/supine. Exercises/Interventions:     Therapeutic Ex (50551)   Min: 25 min Reps/Resistance Notes/CUES   BKFO  SLR  Seated Hamstring Str  LAQ 2x10  2x10  3x30 sec  2x10 HEP  Fair Valerio. HEP  Both sides    Clamshells  2 x 10 Both sides   Sidelying Hip Abd 2 x 10  Both Sides   Bridges 5 sec x 10, 2 sets    SAQ 2#, 2x15                   Manual Intervention (62885)  Min: 15 min     Knee mobs/PROM     Tib/Fem Mobs     Patella Mobs     Ankle mobs     Hip mobs LAD mobs gr 2 , PROM left hip to tolerance, STM to glutes, TFL, quad with a rollerstick         NMR re-education (45390)  Min:  CUES NEEDED             Therapeutic Activity (77750)  Min: 8 min     NuStep Lv2 x 5 min c handles   Mini Squats 2x10    Modalities  Min:     IFC with      CP after exercises     MH after exercises            Other Therapeutic Activities: Pt was educated on PT POC, Diagnosis, Prognosis, pathomechanics as well as frequency and duration of scheduling future physical therapy appointments. Time was also taken on this day to answer all patient questions and participation in PT.  Reviewed appointment policy in detail with patient and patient verbalized understanding. Home Exercise Program: Patient was instructed in the following for HEP:    Bent Knee Fallouts - 1 x daily - 7 x weekly - 2 sets - 10 reps  Supine Straight Leg Raises - 1 x daily - 7 x weekly - 2 sets - 10 reps  Seated Table Hamstring Stretch - 1 x daily - 7 x weekly - 3 sets - 30 hold  Seated Long Arc Quad - 1 x daily - 7 x weekly - 2 sets - 10 reps   Patient verbalized/demonstrated understanding and was issued written handout. Therapeutic Exercise and NMR EXR  [x] (11893) Provided verbal/tactile cueing for activities related to strengthening, flexibility, endurance, ROM for improvements in LE, proximal hip, and core control with self care, mobility, lifting, ambulation.  [] (82813) Provided verbal/tactile cueing for activities related to improving balance, coordination, kinesthetic sense, posture, motor skill, proprioception  to assist with LE, proximal hip, and core control in self care, mobility, lifting, ambulation and eccentric single leg control.      NMR and Therapeutic Activities:    [x] (00696 or 69582) Provided verbal/tactile cueing for activities related to improving balance, coordination, kinesthetic sense, posture, motor skill, proprioception and motor activation to allow for proper function of core, proximal hip and LE with self care and ADLs and functional mobility.   [] (32247) Gait Re-education- Provided training and instruction to the patient for proper LE, core and proximal hip recruitment and positioning and eccentric body weight control with ambulation re-education including up and down stairs     Home Exercise Program:    [x] (76636) Reviewed/Progressed HEP activities related to strengthening, flexibility, endurance, ROM of core, proximal hip and LE for functional self-care, mobility, lifting and ambulation/stair navigation   [] (58525)Reviewed/Progressed HEP activities related to improving balance, coordination, kinesthetic sense, posture, motor skill, proprioception of core, proximal hip and LE for self care, mobility, lifting, and ambulation/stair navigation      Manual Treatments:  PROM / STM / Oscillations-Mobs:  G-I, II, III, IV (PA's, Inf., Post.)  [x] (01299) Provided manual therapy to mobilize LE, proximal hip and/or LS spine soft tissue/joints for the purpose of modulating pain, promoting relaxation,  increasing ROM, reducing/eliminating soft tissue swelling/inflammation/restriction, improving soft tissue extensibility and allowing for proper ROM for normal function with self care, mobility, lifting and ambulation. Charges:  Timed Code Treatment Minutes: 40   Total Treatment Minutes: 40      [] EVAL (LOW) 32634 (typically 20 minutes face-to-face)  [] EVAL (MOD) 38556 (typically 30 minutes face-to-face)  [] EVAL (HIGH) 70531 (typically 45 minutes face-to-face)  [] RE-EVAL     [x] WN(99267) x  1   [] Dry needle 1 or 2 Muscles (26654)  [] NMR (27874) x     [] Dry needle 3+ Muscles (84561)  [x] Manual (54524) x  1   [] Ultrasound (06288) x  [x] TA (63681) x  1   [] Mech Traction (64835)  [] ES(attended) (07090)     [] ES (un) (46376):   [] Vasopump (62204) [] Ionto (72601)   [] Other:    GOALS:  Patient stated goal: \"To be able to walk (my dog) and sit with no pain. \"   []? Progressing: []? Met: []? Not Met: []? Adjusted     Therapist goals for Patient:   Short Term Goals: To be achieved in: 2 weeks  1. Independent in HEP and progression per patient tolerance, in order to prevent re-injury. []? Progressing: []? Met: []? Not Met: []? Adjusted  2. Patient will have a decrease in pain to facilitate improvement in movement, function, and ADLs as indicated by Functional Deficits. []? Progressing: []? Met: []? Not Met: []? Adjusted     Long Term Goals: To be achieved in: 6 weeks  1. A score of 35 or higher on the LEFS to assist with reaching prior level of function. []? Progressing: []? Met: []?  Not Met: []? Adjusted  2. Patient will demonstrate increased L hip flexion AROM to 100 deg without pain to allow for proper joint functioning as indicated by patients Functional Deficits. []? Progressing: []? Met: []? Not Met: []? Adjusted  3. Patient will demonstrate an increase in glute Strength to 4/5 to allow for proper functional mobility as indicated by patients Functional Deficits. []? Progressing: []? Met: []? Not Met: []? Adjusted  4. Patient will return to walking 1 mile without increased symptoms or restriction. []? Progressing: []? Met: []? Not Met: []? Adjusted  5. Patient will be able to tolerate sitting for 1 hour without increased pain/syptoms upon transferring to standing. []? Progressing: []? Met: []? Not Met: []? Adjusted            ASSESSMENT:  Tolerate rx well,better hip ER/IR rom,tolerated mini squats well. Treatment/Activity Tolerance:  [x] Patient tolerated treatment well [] Patient limited by fatique  [] Patient limited by pain  [] Patient limited by other medical complications  [] Other:     Overall Progression Towards Functional goals/ Treatment Progress Update:  [] Patient is progressing as expected towards functional goals listed. [] Progression is slowed due to complexities/Impairments listed. [] Progression has been slowed due to co-morbidities.   [x] Plan just implemented, too soon to assess goals progression <30days   [] Goals require adjustment due to lack of progress  [] Patient is not progressing as expected and requires additional follow up with physician  [] Other    Prognosis for POC: [x] Good [] Fair  [] Poor    Patient requires continued skilled intervention: [x] Yes  [] No        PLAN:   [] Continue per plan of care [] Alter current plan (see comments)  [x] Plan of care initiated [] Hold pending MD visit [] Discharge    Electronically signed by: Jose Orozco, PTA [id number]    Note: If patient does not return for scheduled/recommended follow up visits, this note will serve as a discharge from care along with the most recent update on progress.

## 2022-01-06 ENCOUNTER — APPOINTMENT (OUTPATIENT)
Dept: PHYSICAL THERAPY | Age: 62
End: 2022-01-06
Payer: COMMERCIAL

## 2022-01-11 ENCOUNTER — HOSPITAL ENCOUNTER (OUTPATIENT)
Dept: PHYSICAL THERAPY | Age: 62
Setting detail: THERAPIES SERIES
Discharge: HOME | End: 2022-01-11
Payer: COMMERCIAL

## 2022-01-11 PROCEDURE — 97530 THERAPEUTIC ACTIVITIES: CPT

## 2022-01-11 PROCEDURE — 97140 MANUAL THERAPY 1/> REGIONS: CPT

## 2022-01-11 PROCEDURE — 97110 THERAPEUTIC EXERCISES: CPT

## 2022-01-11 NOTE — FLOWSHEET NOTE
[facility] - Outpatient Rehabilitation & Therapy  [address] [facility]. Maninder Rosenberg  Phone: [phone]   Fax:     [phone]      Physical Therapy Treatment Note/ Progress Report:     Date:  2022    Patient Name:  Gail Yancey    :  1960  MRN: [id number]    Pertinent Medical History:Additional Pertinent Hx: HPL, HTN    Medical/Treatment Diagnosis Information:  · Diagnosis: M16.12 (ICD-10-CM) - Primary osteoarthritis of left hip  · Treatment Diagnosis: Decreased mobility and strength. Insurance/Certification information:  PT Insurance Information: [employer]  Physician Information:  Referring Practitioner: JUAN Carmen  Plan of care signed (Y/N): Signed on 21    Date of Patient follow up with Physician:      Progress Report: []  Yes  [x]  No     Date Range for reporting period:  Beginnin2021  Ending:      Progress report due (10 Rx/or 30 days whichever is less):      Recertification due (POC duration/ or 90 days whichever is less):       Visit # POC/Insurance Allowable Auth Needed   5 30 visits pcy  (2 visits used to date) []Yes   [x]No     Latex Allergy:  [x]NO      []YES  Preferred Language for Healthcare:   [x]English       []Other:    Functional Scale:      Date assessed: at eval (21)  Test: LEFS  Score: 20    Pain level:  Not assessed today/10     History of Injury: Patient reports L hip pain. 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 Pain is located in L groin and lateral hip. Pt denies N/T. He received injections and a steroid pack which has helped decreased the pain. 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 He states overall his hip has gotten better and he is looking forward to doing PT.  He states in the beginning his hip pain was so bad that he had to use crutches. He no longer using an assistive device. Pt reports some popping in the L hip and occassional pain in R knee. He states he feels like he is walking differently/compensating. In March 2021 he underwent open heart surgery (triple bypass). SUBJECTIVE:  12/23/21: Patient reports groin area has been a little more sore in a groin area. States pain is the worst after he drives in his [employer] and has to get out. 12/29/21: Pt reports his hip overall is feeling better. He states he was sore after the PT initial eval, but his hip has been better since then. 01/04/22: Patient reports hip is feeling looser,not as much soreness  as he gets out of the car.   1/11/22: Pt states he is doing okay. He is a little upset as he was told he may have to undergo a procedure for a leaky valve in his heart. He reports his hip is feeling really good. Pt states he has been able to walk more. OBJECTIVE:   Observation:    Test measurements:      RESTRICTIONS/PRECAUTIONS: Pt occasionally experiences claustrophobia when laying flat/supine. Exercises/Interventions:     Therapeutic Ex (44734)   Min: 25 min Reps/Resistance Notes/CUES   BKFO  SLR  Seated Hamstring Str  LAQ 2x10  2x10  3x30 sec  2#, 2x10 HEP  Fair Valerio.   HEP  Both sides    Clamshells  2 x 10 Both sides   Sidelying Hip Abd 2 x 10  Both Sides   Bridges 5 sec x 10, 2 sets    SAQ 2#, 2x15    Hip flexor str 5x10 sec, Both sides At stairs             Manual Intervention (28487)  Min: 15 min     Knee mobs/PROM     Tib/Fem Mobs     Patella Mobs     Ankle mobs     Hip mobs LAD mobs gr 2 , PROM left hip to tolerance, STM to glutes, TFL, quad with a rollerstick         NMR re-education (44782)  Min:  CUES NEEDED             Therapeutic Activity (70154)  Min: 8 min     NuStep Lv2 x 5 min c handles   Mini Squats 2x10    Modalities  Min:     IFC with      CP after exercises     MH after exercises            Other Therapeutic Activities: Pt was educated on PT POC, Diagnosis, Prognosis, pathomechanics as well as frequency and duration of scheduling future physical therapy appointments. Time was also taken on this day to answer all patient questions and participation in PT. Reviewed appointment policy in detail with patient and patient verbalized understanding. Home Exercise Program: Patient was instructed in the following for HEP:    Bent Knee Fallouts - 1 x daily - 7 x weekly - 2 sets - 10 reps  Supine Straight Leg Raises - 1 x daily - 7 x weekly - 2 sets - 10 reps  Seated Table Hamstring Stretch - 1 x daily - 7 x weekly - 3 sets - 30 hold  Seated Long Arc Quad - 1 x daily - 7 x weekly - 2 sets - 10 reps   Patient verbalized/demonstrated understanding and was issued written handout. Therapeutic Exercise and NMR EXR  [x] (64988) Provided verbal/tactile cueing for activities related to strengthening, flexibility, endurance, ROM for improvements in LE, proximal hip, and core control with self care, mobility, lifting, ambulation.  [] (70959) Provided verbal/tactile cueing for activities related to improving balance, coordination, kinesthetic sense, posture, motor skill, proprioception  to assist with LE, proximal hip, and core control in self care, mobility, lifting, ambulation and eccentric single leg control.      NMR and Therapeutic Activities:    [x] (01096 or 96074) Provided verbal/tactile cueing for activities related to improving balance, coordination, kinesthetic sense, posture, motor skill, proprioception and motor activation to allow for proper function of core, proximal hip and LE with self care and ADLs and functional mobility.   [] (53856) Gait Re-education- Provided training and instruction to the patient for proper LE, core and proximal hip recruitment and positioning and eccentric body weight control with ambulation re-education including up and down stairs     Home Exercise Program:    [x] (10416) Reviewed/Progressed HEP activities related to strengthening, flexibility, endurance, ROM of core, proximal hip and LE for functional self-care, mobility, lifting and ambulation/stair navigation   [] (25215)Reviewed/Progressed HEP activities related to improving balance, coordination, kinesthetic sense, posture, motor skill, proprioception of core, proximal hip and LE for self care, mobility, lifting, and ambulation/stair navigation      Manual Treatments:  PROM / STM / Oscillations-Mobs:  G-I, II, III, IV (PA's, Inf., Post.)  [x] (23027) Provided manual therapy to mobilize LE, proximal hip and/or LS spine soft tissue/joints for the purpose of modulating pain, promoting relaxation,  increasing ROM, reducing/eliminating soft tissue swelling/inflammation/restriction, improving soft tissue extensibility and allowing for proper ROM for normal function with self care, mobility, lifting and ambulation. Charges:  Timed Code Treatment Minutes: 45   Total Treatment Minutes: 45      [] EVAL (LOW) 60069 (typically 20 minutes face-to-face)  [] EVAL (MOD) 88914 (typically 30 minutes face-to-face)  [] EVAL (HIGH) 68994 (typically 45 minutes face-to-face)  [] RE-EVAL     [x] DC(01220) x  1   [] Dry needle 1 or 2 Muscles (71812)  [] NMR (46706) x     [] Dry needle 3+ Muscles (16389)  [x] Manual (65830) x  1   [] Ultrasound (11144) x  [x] TA (49918) x  1   [] [facility] (20187)  [] ES(attended) (93580)     [] ES (un) (69205):   [] Vasopump (64575) [] Ionto (21363)   [] Other:    GOALS:  Patient stated goal: \"To be able to walk (my dog) and sit with no pain. \"   []? Progressing: []? Met: []? Not Met: []? Adjusted     Therapist goals for Patient:   Short Term Goals: To be achieved in: 2 weeks  1. Independent in HEP and progression per patient tolerance, in order to prevent re-injury. []? Progressing: []? Met: []? Not Met: []? Adjusted  2.  Patient will have a decrease in pain to facilitate improvement in movement, function, and ADLs as indicated by Functional Deficits. []? Progressing: []? Met: []? Not Met: []? Adjusted     Long Term Goals: To be achieved in: 6 weeks  1. A score of 35 or higher on the LEFS to assist with reaching prior level of function. []? Progressing: []? Met: []? Not Met: []? Adjusted  2. Patient will demonstrate increased L hip flexion AROM to 100 deg without pain to allow for proper joint functioning as indicated by patients Functional Deficits. []? Progressing: []? Met: []? Not Met: []? Adjusted  3. Patient will demonstrate an increase in glute Strength to 4/5 to allow for proper functional mobility as indicated by patients Functional Deficits. []? Progressing: []? Met: []? Not Met: []? Adjusted  4. Patient will return to walking 1 mile without increased symptoms or restriction. []? Progressing: []? Met: []? Not Met: []? Adjusted  5. Patient will be able to tolerate sitting for 1 hour without increased pain/syptoms upon transferring to standing. []? Progressing: []? Met: []? Not Met: []? Adjusted            ASSESSMENT:  Tolerate rx well, better hip ER/IR rom, tolerated mini squats well. Continue to progress glute/hip musculature strength training as tolerated. Treatment/Activity Tolerance:  [x] Patient tolerated treatment well [] Patient limited by fatique  [] Patient limited by pain  [] Patient limited by other medical complications  [] Other:     Overall Progression Towards Functional goals/ Treatment Progress Update:  [] Patient is progressing as expected towards functional goals listed. [] Progression is slowed due to complexities/Impairments listed. [] Progression has been slowed due to co-morbidities.   [x] Plan just implemented, too soon to assess goals progression <30days   [] Goals require adjustment due to lack of progress  [] Patient is not progressing as expected and requires additional follow up with physician  [] Other    Prognosis for POC: [x] Good [] Fair  [] Poor    Patient requires continued skilled intervention: [x] Yes  [] No        PLAN:   [] Continue per plan of care [] Alter current plan (see comments)  [x] Plan of care initiated [] Hold pending MD visit [] Discharge    Electronically signed by: [place] [id number]    Note: If patient does not return for scheduled/recommended follow up visits, this note will serve as a discharge from care along with the most recent update on progress.

## 2022-01-13 ENCOUNTER — HOSPITAL ENCOUNTER (OUTPATIENT)
Dept: PHYSICAL THERAPY | Age: 62
Setting detail: THERAPIES SERIES
Discharge: HOME | End: 2022-01-13
Payer: COMMERCIAL

## 2022-01-13 PROCEDURE — 97530 THERAPEUTIC ACTIVITIES: CPT

## 2022-01-13 PROCEDURE — 97110 THERAPEUTIC EXERCISES: CPT

## 2022-01-13 PROCEDURE — 97140 MANUAL THERAPY 1/> REGIONS: CPT

## 2022-01-13 NOTE — FLOWSHEET NOTE
[facility] - Outpatient Rehabilitation & Therapy  [address]. [place]  Phone: [phone]   Fax:     [phone]      Physical Therapy Treatment Note/ Progress Report:     Date:  2022    Patient Name:  Yoan Cota    :  1960  MRN: [id number]    Pertinent Medical History:Additional Pertinent Hx: HPL, HTN    Medical/Treatment Diagnosis Information:  · Diagnosis: M16.12 (ICD-10-CM) - Primary osteoarthritis of left hip  · Treatment Diagnosis: Decreased mobility and strength. Insurance/Certification information:  PT Insurance Information: Chito Ferrell  Physician Information:  Referring Practitioner: JUAN Gagnon  Plan of care signed (Y/N): Signed on 21    Date of Patient follow up with Physician:      Progress Report: []  Yes  [x]  No     Date Range for reporting period:  Beginnin2021  Ending:      Progress report due (10 Rx/or 30 days whichever is less): 20     Recertification due (POC duration/ or 90 days whichever is less):       Visit # POC/Insurance Allowable Auth Needed   6 30 visits pcy  (2 visits used to date) []Yes   [x]No     Latex Allergy:  [x]NO      []YES  Preferred Language for Healthcare:   [x]English       []Other:    Functional Scale:      Date assessed: at eval (21)  Test: LEFS  Score: 20    Pain level:  Not assessed today/10     History of Injury: Patient reports L hip pain. 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 Pain is located in L groin and lateral hip. Pt denies N/T. He received injections and a steroid pack which has helped decreased the pain. 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 He states overall his hip has gotten better and he is looking forward to doing PT.  He states in the beginning his hip pain was so bad that he had to use crutches. He no longer using an assistive device. Pt reports some popping in the L hip and occassional pain in R knee. He states he feels like he is walking differently/compensating. In March 2021 he underwent open heart surgery (triple bypass). SUBJECTIVE:  12/23/21: Patient reports groin area has been a little more sore in a groin area. States pain is the worst after he drives in his [employer] and has to get out. 12/29/21: Pt reports his hip overall is feeling better. He states he was sore after the PT initial eval, but his hip has been better since then. 01/04/22: Patient reports hip is feeling looser,not as much soreness  as he gets out of the car.   1/11/22: Pt states he is doing okay. He is a little upset as he was told he may have to undergo a procedure for a leaky valve in his heart. He reports his hip is feeling really good. Pt states he has been able to walk more. 1/13/22: Pt reports his hip is feeling pretty good. He does report a little soreness into the L hip. He can't think of anything specific that occurred that lead to the increased soreness. OBJECTIVE:   Observation:    Test measurements:      RESTRICTIONS/PRECAUTIONS: Pt occasionally experiences claustrophobia when laying flat/supine. Exercises/Interventions:     Therapeutic Ex (89050)   Min: 10 min Reps/Resistance Notes/CUES   BKFO  SLR  Seated Hamstring Str  LAQ 2x10  2x10  3x30 sec  2#, 2x10   Fair Valerio.   HEP  Both sides    Clamshells  2 x 10 Both sides   Sidelying Hip Abd 2 x 10  Both Sides   Bridges 5 sec x 10, 2 sets    SAQ 2#, 2x15    Hip flexor str 5x10 sec, Both sides At stairs             Manual Intervention (09128)  Min: 25 min     Knee mobs/PROM     Tib/Fem Mobs     Patella Mobs     Ankle mobs     Hip mobs LAD mobs gr 2 , SAD with mobilization belt, PROM left hip to tolerance, STM to glutes, TFL, quad with a rollerstick         NMR re-education (63429)  Min:  CUES NEEDED             Therapeutic Activity (80590)  Min: 10 min     NuStep Lv2 x 6 min c handles   Mini Squats 2x10    Modalities  Min:     IFC with      CP after exercises     MH after exercises            Other Therapeutic Activities: Pt was educated on PT POC, Diagnosis, Prognosis, pathomechanics as well as frequency and duration of scheduling future physical therapy appointments. Time was also taken on this day to answer all patient questions and participation in PT. Reviewed appointment policy in detail with patient and patient verbalized understanding. Home Exercise Program: Patient was instructed in the following for HEP:    Bent Knee Fallouts - 1 x daily - 7 x weekly - 2 sets - 10 reps  Supine Straight Leg Raises - 1 x daily - 7 x weekly - 2 sets - 10 reps  Seated Table Hamstring Stretch - 1 x daily - 7 x weekly - 3 sets - 30 hold  Seated Long Arc Quad - 1 x daily - 7 x weekly - 2 sets - 10 reps   Patient verbalized/demonstrated understanding and was issued written handout. Therapeutic Exercise and NMR EXR  [x] (63605) Provided verbal/tactile cueing for activities related to strengthening, flexibility, endurance, ROM for improvements in LE, proximal hip, and core control with self care, mobility, lifting, ambulation.  [] (19125) Provided verbal/tactile cueing for activities related to improving balance, coordination, kinesthetic sense, posture, motor skill, proprioception  to assist with LE, proximal hip, and core control in self care, mobility, lifting, ambulation and eccentric single leg control.      NMR and Therapeutic Activities:    [x] (76605 or 30358) Provided verbal/tactile cueing for activities related to improving balance, coordination, kinesthetic sense, posture, motor skill, proprioception and motor activation to allow for proper function of core, proximal hip and LE with self care and ADLs and functional mobility.   [] (73683) Gait Re-education- Provided training and instruction to the patient for proper LE, core and proximal hip recruitment and positioning and eccentric body weight control with ambulation re-education including up and down stairs     Home Exercise Program:    [x] (01600) Reviewed/Progressed HEP activities related to strengthening, flexibility, endurance, ROM of core, proximal hip and LE for functional self-care, mobility, lifting and ambulation/stair navigation   [] (63324)Reviewed/Progressed HEP activities related to improving balance, coordination, kinesthetic sense, posture, motor skill, proprioception of core, proximal hip and LE for self care, mobility, lifting, and ambulation/stair navigation      Manual Treatments:  PROM / STM / Oscillations-Mobs:  G-I, II, III, IV (PA's, Inf., Post.)  [x] (28701) Provided manual therapy to mobilize LE, proximal hip and/or LS spine soft tissue/joints for the purpose of modulating pain, promoting relaxation,  increasing ROM, reducing/eliminating soft tissue swelling/inflammation/restriction, improving soft tissue extensibility and allowing for proper ROM for normal function with self care, mobility, lifting and ambulation. Charges:  Timed Code Treatment Minutes: 45   Total Treatment Minutes: 45      [] EVAL (LOW) 40684 (typically 20 minutes face-to-face)  [] EVAL (MOD) 75242 (typically 30 minutes face-to-face)  [] EVAL (HIGH) 98312 (typically 45 minutes face-to-face)  [] RE-EVAL     [x] DA(31075) x  1   [] Dry needle 1 or 2 Muscles (13641)  [] NMR (92390) x     [] Dry needle 3+ Muscles (98056)  [x] Manual (83074) x  1   [] Ultrasound (91657) x  [x] TA (50455) x  1   [] Mech Traction (59135)  [] ES(attended) (37957)     [] ES (un) (96778):   [] Vasopump (23371) [] Ionto (67768)   [] Other:    GOALS:  Patient stated goal: \"To be able to walk (my dog) and sit with no pain. \"   []? Progressing: []? Met: []? Not Met: []? Adjusted     Therapist goals for Patient:   Short Term Goals: To be achieved in: 2 weeks  1.  Independent in HEP and progression per patient tolerance, in order to prevent re-injury. []? Progressing: []? Met: []? Not Met: []? Adjusted  2. Patient will have a decrease in pain to facilitate improvement in movement, function, and ADLs as indicated by Functional Deficits. []? Progressing: []? Met: []? Not Met: []? Adjusted     Long Term Goals: To be achieved in: 6 weeks  1. A score of 35 or higher on the LEFS to assist with reaching prior level of function. []? Progressing: []? Met: []? Not Met: []? Adjusted  2. Patient will demonstrate increased L hip flexion AROM to 100 deg without pain to allow for proper joint functioning as indicated by patients Functional Deficits. []? Progressing: []? Met: []? Not Met: []? Adjusted  3. Patient will demonstrate an increase in glute Strength to 4/5 to allow for proper functional mobility as indicated by patients Functional Deficits. []? Progressing: []? Met: []? Not Met: []? Adjusted  4. Patient will return to walking 1 mile without increased symptoms or restriction. []? Progressing: []? Met: []? Not Met: []? Adjusted  5. Patient will be able to tolerate sitting for 1 hour without increased pain/syptoms upon transferring to standing. []? Progressing: []? Met: []? Not Met: []? Adjusted            ASSESSMENT:  Tolerate rx well, better hip ER/IR rom, tolerated mini squats well. Good tolerance to addition of SAD manual therapy technique. Continue to progress glute/hip musculature strength training as tolerated. Treatment/Activity Tolerance:  [x] Patient tolerated treatment well [] Patient limited by fatique  [] Patient limited by pain  [] Patient limited by other medical complications  [] Other:     Overall Progression Towards Functional goals/ Treatment Progress Update:  [] Patient is progressing as expected towards functional goals listed. [] Progression is slowed due to complexities/Impairments listed. [] Progression has been slowed due to co-morbidities.   [x] Plan just implemented, too soon to assess goals progression <30days   [] Goals require adjustment due to lack of progress  [] Patient is not progressing as expected and requires additional follow up with physician  [] Other    Prognosis for POC: [x] Good [] Fair  [] Poor    Patient requires continued skilled intervention: [x] Yes  [] No        PLAN:   [] Continue per plan of care [] Alter current plan (see comments)  [x] Plan of care initiated [] Hold pending MD visit [] Discharge    Electronically signed by: [place] [id number]    Note: If patient does not return for scheduled/recommended follow up visits, this note will serve as a discharge from care along with the most recent update on progress.

## 2022-01-18 ENCOUNTER — HOSPITAL ENCOUNTER (OUTPATIENT)
Dept: PHYSICAL THERAPY | Age: 62
Setting detail: THERAPIES SERIES
Discharge: HOME | End: 2022-01-18
Payer: COMMERCIAL

## 2022-01-18 NOTE — FLOWSHEET NOTE
[facility].  [place] [address]  Phone: [phone]   Fax:     [phone]    Physical Therapy  Cancellation/No-show Note  Patient Name:  Dionne Rene  :  1960   Date:  2022  Cancelled visits to date: 1  No-shows to date: 0    Patient status for today's appointment patient:  [x]  Cancelled  []  Rescheduled appointment  []  No-show     Reason given by patient:  []  Patient ill  []  Conflicting appointment  []  No transportation    []  Conflict with work  [x]  No reason given  []  Other:     Comments:      Phone call information:   []  Phone call made today to patient at _ time at number provided:      []  Patient answered, conversation as follows:    []  Patient did not answer, message left as follows:  [x]  Phone call not made today    Electronically signed by:  Rita Powell PTA

## 2022-08-19 ENCOUNTER — OFFICE VISIT (OUTPATIENT)
Dept: FAMILY MEDICINE CLINIC | Age: 62
End: 2022-08-19
Payer: COMMERCIAL

## 2022-08-19 VITALS
WEIGHT: 170 LBS | HEIGHT: 70 IN | BODY MASS INDEX: 24.34 KG/M2 | HEART RATE: 61 BPM | DIASTOLIC BLOOD PRESSURE: 76 MMHG | OXYGEN SATURATION: 98 % | SYSTOLIC BLOOD PRESSURE: 118 MMHG

## 2022-08-19 DIAGNOSIS — L30.9 DERMATITIS: Primary | ICD-10-CM

## 2022-08-19 PROCEDURE — 99213 OFFICE O/P EST LOW 20 MIN: CPT

## 2022-08-19 ASSESSMENT — ENCOUNTER SYMPTOMS
SORE THROAT: 0
COUGH: 0
DIARRHEA: 0
WHEEZING: 0
BACK PAIN: 0
ABDOMINAL PAIN: 0
SHORTNESS OF BREATH: 0
EYE PAIN: 0
NAUSEA: 0
EYE DISCHARGE: 0
ABDOMINAL DISTENTION: 0
SINUS PAIN: 0
VOMITING: 0
SINUS PRESSURE: 0
EYE REDNESS: 0

## 2022-08-19 ASSESSMENT — PATIENT HEALTH QUESTIONNAIRE - PHQ9
2. FEELING DOWN, DEPRESSED OR HOPELESS: 0
SUM OF ALL RESPONSES TO PHQ9 QUESTIONS 1 & 2: 0
1. LITTLE INTEREST OR PLEASURE IN DOING THINGS: 0
SUM OF ALL RESPONSES TO PHQ QUESTIONS 1-9: 0

## 2022-08-19 NOTE — PROGRESS NOTES
Gage Mack (:  1960) is a 60 y.o. male,Established patient, here for evaluation of the following chief complaint(s):  Rash (RASH ON THE GERARDO SIDE OF NECK, NECK IS SORE, PATIENT WOULD LIKE TO RULE OUT SHINGLES, HAS BEEN USING PEROXIDE AND SEEMS TO BE CLEARING UP. )         ASSESSMENT/PLAN:  1. Dermatitis  -Shingles versus dermatitis. Given that the rash extended over multiple dermatomes, the pain is not burning, the pain is not associated with the location of the rash, and rash improved with alcohol and peroxide, my index of suspicion is low for shingles. -Discussed dermatitis treatment with the patient. Patient opted to seek out over-the-counter treatments. Recommended hydrocortisone 1% cream or Benadryl cream to treat the pruritus. Follow-up for symptoms worsen or fail to improve.  -Overdue for physical and fasting labs. Recommended to follow-up this year to get this taken care of. Return in about 3 months (around 2022), or if symptoms worsen or fail to improve, for Physical, Fasting Labs. Subjective   SUBJECTIVE/OBJECTIVE:  BRYN Ibarra presents today for a rash and soreness of his neck. He is using peroxide and alcohol and this is improved the rash. Is been there for 3 to 4 weeks. He states the pain in his neck is more like soreness like he slept on it wrong, or is a kink in his neck. Denies any burning pain. Denies any drainage, vesicles, fever, chills, or cough. He also reported that it was on his right ear. Tylenol helps his neck soreness. He is concerned for shingles. Additionally, he no showed his appointment 2 months ago. He states that the PT is not helping his hip. Review of Systems   Constitutional:  Negative for chills and fever. HENT:  Negative for ear discharge, ear pain, hearing loss, sinus pressure, sinus pain and sore throat. Eyes:  Negative for pain, discharge and redness. Respiratory:  Negative for cough, shortness of breath and wheezing. Cardiovascular:  Negative for chest pain and palpitations. Gastrointestinal:  Negative for abdominal distention, abdominal pain, diarrhea, nausea and vomiting. Genitourinary:  Negative for dysuria and hematuria. Musculoskeletal:  Positive for neck pain. Negative for arthralgias, back pain and myalgias. Skin:  Positive for rash. Neurological:  Negative for weakness, numbness and headaches. Psychiatric/Behavioral:  Negative for dysphoric mood. The patient is not nervous/anxious. Objective   Physical Exam  Constitutional:       General: He is not in acute distress. Appearance: Normal appearance. He is normal weight. HENT:      Head: Normocephalic and atraumatic. Cardiovascular:      Rate and Rhythm: Normal rate and regular rhythm. Pulses: Normal pulses. Heart sounds: Normal heart sounds. No murmur heard. No gallop. Pulmonary:      Effort: Pulmonary effort is normal.      Breath sounds: Normal breath sounds. No wheezing. Musculoskeletal:         General: No tenderness or signs of injury. Normal range of motion. Cervical back: Normal range of motion and neck supple. No rigidity or tenderness. Skin:     General: Skin is warm and dry. Capillary Refill: Capillary refill takes less than 2 seconds. Findings: Rash (presence of raised plaques on erythematous bilateral neck, right ear; appears healing, irritated skin to right ear) present. No bruising. Neurological:      Mental Status: He is alert and oriented to person, place, and time. Psychiatric:         Mood and Affect: Mood normal.         Behavior: Behavior normal.         Thought Content: Thought content normal.         Judgment: Judgment normal.        Please note that this chart was generated using dragon dictation software. Although every effort was made to ensure the accuracy of this automated transcription, some errors in transcription may have occurred.           An electronic signature was used to

## 2022-12-02 ENCOUNTER — TELEMEDICINE (OUTPATIENT)
Dept: FAMILY MEDICINE CLINIC | Age: 62
End: 2022-12-02
Payer: COMMERCIAL

## 2022-12-02 DIAGNOSIS — J06.9 BACTERIAL URI: Primary | ICD-10-CM

## 2022-12-02 DIAGNOSIS — B96.89 BACTERIAL URI: Primary | ICD-10-CM

## 2022-12-02 PROCEDURE — 99441 PR PHYS/QHP TELEPHONE EVALUATION 5-10 MIN: CPT

## 2022-12-02 RX ORDER — BENZONATATE 100 MG/1
100-200 CAPSULE ORAL 3 TIMES DAILY PRN
Qty: 60 CAPSULE | Refills: 0 | Status: SHIPPED | OUTPATIENT
Start: 2022-12-02 | End: 2022-12-09

## 2022-12-02 RX ORDER — SACUBITRIL AND VALSARTAN 24; 26 MG/1; MG/1
TABLET, FILM COATED ORAL
COMMUNITY
Start: 2022-11-14

## 2022-12-02 RX ORDER — SILDENAFIL 100 MG/1
TABLET, FILM COATED ORAL
COMMUNITY
Start: 2022-11-19

## 2022-12-02 RX ORDER — AZITHROMYCIN 250 MG/1
250 TABLET, FILM COATED ORAL SEE ADMIN INSTRUCTIONS
Qty: 6 TABLET | Refills: 0 | Status: SHIPPED | OUTPATIENT
Start: 2022-12-02 | End: 2022-12-07

## 2022-12-02 ASSESSMENT — ENCOUNTER SYMPTOMS
CONSTIPATION: 0
SORE THROAT: 0
EYE REDNESS: 0
DIARRHEA: 0
EYE ITCHING: 0
ABDOMINAL PAIN: 0
CHEST TIGHTNESS: 0
RHINORRHEA: 0
SINUS PAIN: 0
SHORTNESS OF BREATH: 0
ABDOMINAL DISTENTION: 0
VOMITING: 0
EYE DISCHARGE: 0
NAUSEA: 0
COUGH: 1
WHEEZING: 0
TROUBLE SWALLOWING: 0
SINUS PRESSURE: 0

## 2022-12-02 NOTE — PROGRESS NOTES
Salena Stock is a 60 y.o. male evaluated via telephone on 12/2/2022 for Congestion (CHEST CONGESTION, COUGH, ONGOING 2 WEEKS, WHEN FIRST STARTED HAD FEVER  AND BODY ACHES. )    Assessment & Plan   Below is the assessment and plan developed based on review of pertinent history, physical exam, labs, studies, and medications. 1. Bacterial URI  -Given 2 weeks of symptoms, suggestive of bacterial URI. -Treatment options discussed. [facility] and [facility] are being sent to the pharmacy. The medication uses and side effects were discussed with the patient. Patient verbalized understanding and agrees to the plan. -Emphasis on rest and hydration.  -Can continue acetaminophen for symptoms.  -Can also consider cold flu and sinus medications OTC. Recommending avoiding Sudafed. -Follow-up as needed. -     azithromycin (ZITHROMAX) 250 MG tablet; Take 1 tablet by mouth See Admin Instructions for 5 days 500mg on day 1 followed by 250mg on days 2 - 5, Disp-6 tablet, R-0Normal  -     benzonatate (TESSALON) 100 MG capsule; Take 1-2 capsules by mouth 3 times daily as needed for Cough, Disp-60 capsule, R-0Normal  Return if symptoms worsen or fail to improve. Subjective   HPI  Rex [place] presents today via telephone for congestion and cough for about 2 weeks. He had slight fever, body aches. Tylenol worked for all of his symptoms. He also has been using Angulo's plus for the cough, it is worked okay, cough continues. He also can't shake for nasal and chest congestion. Productive cough was dark green now but has now become light green. He states his fevers are resolved. He denies any shortness of breath, orthopnea, chest pain, chest pressure, dizziness, headaches, fatigue, ear issues, eye issues, bowel or bladder pattern changes, or sore throat. Review of Systems   Constitutional:  Negative for chills, fatigue and fever. HENT:  Positive for congestion.  Negative for ear discharge, ear pain, postnasal drip, rhinorrhea, sinus pressure, sinus pain, sneezing, sore throat and trouble swallowing. Eyes:  Negative for discharge, redness, itching and visual disturbance. Respiratory:  Positive for cough. Negative for chest tightness, shortness of breath and wheezing. Cardiovascular:  Negative for chest pain and palpitations. Gastrointestinal:  Negative for abdominal distention, abdominal pain, constipation, diarrhea, nausea and vomiting. Musculoskeletal:  Negative for myalgias. Skin:  Negative for pallor and rash. Allergic/Immunologic: Negative for environmental allergies. Neurological:  Negative for dizziness, weakness, light-headedness and headaches. Psychiatric/Behavioral:  Negative for confusion, dysphoric mood and sleep disturbance. The patient is not nervous/anxious.          Objective   Patient-Reported Vitals  No data recorded     Physical Exam  [INSTRUCTIONS:  \"[x]\" Indicates a positive item  \"[]\" Indicates a negative item  -- DELETE ALL ITEMS NOT EXAMINED]    Constitutional: [x] Appears well-developed and well-nourished [x] No apparent distress      [] Abnormal -     Mental status: [x] Alert and awake  [x] Oriented to person/place/time [x] Able to follow commands    [] Abnormal -     Eyes:   EOM    []  Normal    [] Abnormal -   Sclera  []  Normal    [] Abnormal -          Discharge []  None visible   [] Abnormal -     HENT: [] Normocephalic, atraumatic  [] Abnormal -   [] Mouth/Throat: Mucous membranes are moist    External Ears [] Normal  [] Abnormal -    Neck: [] No visualized mass [] Abnormal -     Pulmonary/Chest: [x] Respiratory effort normal   [] No visualized signs of difficulty breathing or respiratory distress        [x] Abnormal -cough     Musculoskeletal:   [] Normal gait with no signs of ataxia         [] Normal range of motion of neck        [] Abnormal -     Neurological:        [] No Facial Asymmetry (Cranial nerve 7 motor function) (limited exam due to video visit)          [] No gaze palsy        [] Abnormal -          Skin:        [x] No significant exanthematous lesions or discoloration noted on facial skin         [] Abnormal -            Psychiatric:       [x] Normal Affect [] Abnormal -        [x] No Hallucinations    Other pertinent observable physical exam findings:-Physical exam limited by telephone visit. Documentation:  I communicated with the patient and/or health care decision maker about sinus infection. Details of this discussion including any medical advice provided: See above    Total Time: minutes: 5-10 minutes    Salena Stock was evaluated through a synchronous (real-time) audio encounter. Patient identification was verified at the start of the visit. He (or guardian if applicable) is aware that this is a billable service, which includes applicable co-pays. This visit was conducted with the patient's (and/or legal guardian's) verbal consent. He has not had a related appointment within my department in the past 7 days or scheduled within the next 24 hours. The patient was located at Home: [address] [place] [address]. The provider was located at [facility] (Appt Dept): [address] Sanjay Colon Note: not billable if this call serves to triage the patient into an appointment for the relevant concern    Dominic Betts, APRN - CNP       Please note that this chart was generated using dragon dictation software. Although every effort was made to ensure the accuracy of this automated transcription, some errors in transcription may have occurred.

## 2023-07-11 LAB
CHOLESTEROL, TOTAL: 91 MG/DL
CHOLESTEROL/HDL RATIO: ABNORMAL
ESTIMATED AVERAGE GLUCOSE: 123
HBA1C MFR BLD: 5.9 %
HDLC SERPL-MCNC: 29 MG/DL (ref 35–70)
LDL CHOLESTEROL CALCULATED: 43 MG/DL (ref 0–160)
NONHDLC SERPL-MCNC: 62 MG/DL
TRIGL SERPL-MCNC: 95 MG/DL
VLDLC SERPL CALC-MCNC: ABNORMAL MG/DL

## 2024-05-22 ENCOUNTER — OFFICE VISIT (OUTPATIENT)
Dept: FAMILY MEDICINE CLINIC | Age: 64
End: 2024-05-22
Payer: MEDICAID

## 2024-05-22 VITALS — BODY MASS INDEX: 24.34 KG/M2 | HEIGHT: 70 IN | WEIGHT: 170 LBS

## 2024-05-22 DIAGNOSIS — L23.7 POISON IVY DERMATITIS: Primary | ICD-10-CM

## 2024-05-22 PROCEDURE — 1123F ACP DISCUSS/DSCN MKR DOCD: CPT | Performed by: NURSE PRACTITIONER

## 2024-05-22 PROCEDURE — 99213 OFFICE O/P EST LOW 20 MIN: CPT | Performed by: NURSE PRACTITIONER

## 2024-05-22 RX ORDER — METHYLPREDNISOLONE ACETATE 80 MG/ML
80 INJECTION, SUSPENSION INTRA-ARTICULAR; INTRALESIONAL; INTRAMUSCULAR; SOFT TISSUE ONCE
Status: COMPLETED | OUTPATIENT
Start: 2024-05-22 | End: 2024-05-22

## 2024-05-22 RX ORDER — DAPAGLIFLOZIN 10 MG/1
10 TABLET, FILM COATED ORAL EVERY MORNING
COMMUNITY

## 2024-05-22 RX ORDER — METOPROLOL SUCCINATE 25 MG/1
25 TABLET, EXTENDED RELEASE ORAL DAILY
COMMUNITY

## 2024-05-22 RX ADMIN — METHYLPREDNISOLONE ACETATE 80 MG: 80 INJECTION, SUSPENSION INTRA-ARTICULAR; INTRALESIONAL; INTRAMUSCULAR; SOFT TISSUE at 17:14

## 2024-05-22 SDOH — ECONOMIC STABILITY: FOOD INSECURITY: WITHIN THE PAST 12 MONTHS, THE FOOD YOU BOUGHT JUST DIDN'T LAST AND YOU DIDN'T HAVE MONEY TO GET MORE.: NEVER TRUE

## 2024-05-22 SDOH — ECONOMIC STABILITY: FOOD INSECURITY: WITHIN THE PAST 12 MONTHS, YOU WORRIED THAT YOUR FOOD WOULD RUN OUT BEFORE YOU GOT MONEY TO BUY MORE.: NEVER TRUE

## 2024-05-22 SDOH — ECONOMIC STABILITY: HOUSING INSECURITY
IN THE LAST 12 MONTHS, WAS THERE A TIME WHEN YOU DID NOT HAVE A STEADY PLACE TO SLEEP OR SLEPT IN A SHELTER (INCLUDING NOW)?: NO

## 2024-05-22 SDOH — ECONOMIC STABILITY: INCOME INSECURITY: HOW HARD IS IT FOR YOU TO PAY FOR THE VERY BASICS LIKE FOOD, HOUSING, MEDICAL CARE, AND HEATING?: NOT HARD AT ALL

## 2024-05-22 ASSESSMENT — PATIENT HEALTH QUESTIONNAIRE - PHQ9
SUM OF ALL RESPONSES TO PHQ QUESTIONS 1-9: 0
2. FEELING DOWN, DEPRESSED OR HOPELESS: NOT AT ALL
SUM OF ALL RESPONSES TO PHQ QUESTIONS 1-9: 0
1. LITTLE INTEREST OR PLEASURE IN DOING THINGS: NOT AT ALL
SUM OF ALL RESPONSES TO PHQ QUESTIONS 1-9: 0
SUM OF ALL RESPONSES TO PHQ QUESTIONS 1-9: 0
SUM OF ALL RESPONSES TO PHQ9 QUESTIONS 1 & 2: 0

## 2024-05-22 ASSESSMENT — ENCOUNTER SYMPTOMS
SHORTNESS OF BREATH: 0
COUGH: 0
WHEEZING: 0

## 2024-05-22 NOTE — PATIENT INSTRUCTIONS
even if current refills have been exhausted. If you are on a controlled medication you will be referred to a specialist (pain specialist, psychiatry, etc).   Forms: There is a $35 fee to fill out FMLA/Disability paperwork, payable at time of . Instead of the fee, you can choose to have the paperwork filled out during a separate office visit that is for filling out the paperwork only.  Medication Samples:  This office does not carry medication samples.  If you need assistance in getting your medications, then please let the medical assistant know so they can help you sign up for a drug assistance program that can help get medications at a reduced cost or even free (if you qualify).  Workman's Comp Claims: We do not handle workman's comp cases or claims. You will need to go to an urgent care to be seen or to whomever your employer uses.  General - Any abusive/rude behavior toward staff/providers may be cause for dismissal.      WE NOW OFFER [facility] SELF-SCHEDULING   IN 3 EASY STEPS    SCHEDULE AN APPT AT YOUR CONVENIENCE WITH NO HOLD/WAIT TIME  IN THE [facility] SELECT 'SCHEDULE AN APPOINTMENT' FROM THE MENU  CHOOSE DATE/TIME THAT WORKS FOR YOU    If you don't find an appointment time that works for your schedule, you can also submit an appointment request thru [employer].    CONVENIENT QUALITY CARE AT YOUR FINGERTIPS    NOT ON [facility]?  PLEASE ASK ANY STAFF MEMBER

## 2024-05-22 NOTE — PROGRESS NOTES
Arnol Liu (:  1960) is a 64 y.o. male,Established patient, here for evaluation of the following chief complaint(s):  Poison Ivy (Pt c/o poison ivy that is all over his body and getting worse over the last couple days )      ASSESSMENT/PLAN:  1. Poison ivy dermatitis  -The patient has extensive poison ivy dermatitis.  Discussed options.  80 mg Depo-Medrol and 100 mg already Solu-Cortef were administered by the medical assistant today.  Patient tolerated well.  Educated on medication use as well as side effects.  Follow-up as needed  -     methylPREDNISolone acetate (DEPO-MEDROL) injection 80 mg; 80 mg, IntraMUSCular, ONCE, 1 dose, On 24 at 1730SITE:  RIGHT DELTOID NDC#  [address]-8 LOT#  [id number] EXP DATE:  26 VERIFIED BY:     -     hydrocortisone sodium succinate PF (SOLU-CORTEF) injection 100 mg; 100 mg, IntraMUSCular, ONCE, 1 dose, On 24 at 1730SITE: LEFT DELTOID LOT#: [id number] NDC#: 9955-8742-26 EXP: 26 VERIFIED BY:        Return in about 6 months (around 2024) for Annual Physical.    SUBJECTIVE/OBJECTIVE:  BRYN Ambrose presents today for evaluation of poison ivy.  He states that he was working on the yard on Thursday.  Started developing a rash over the weekend.  Has been getting worse with time.  Involves his arms, legs, and face.  Was hoping to get medication to help.  Has been hard to sleep.    The patient continues to follow with cardiology who manages all of his medications.  He has had some changes since last seen.  He is agreeable to a physical before the end of the year.    Review of Systems   Constitutional:  Negative for chills and fever.   Respiratory:  Negative for cough, shortness of breath and wheezing.    Cardiovascular:  Negative for chest pain, palpitations and leg swelling.   Skin:  Positive for rash.   Neurological:  Negative for dizziness, weakness, light-headedness, numbness and headaches.   Psychiatric/Behavioral:  Negative for decreased  Recent PHQ 2/9 Score    PHQ 2:  Date Peds PHQ 2 Score Peds PHQ 2 Interpretation   2/7/2022 0 No further screening needed       PHQ 9:  Date Peds PHQ 9 Score Peds PHQ 9 Interpretation   2/7/2022 1 Minimal Depression

## 2024-05-22 NOTE — PROGRESS NOTES
Administrations This Visit       hydrocortisone sodium succinate PF (SOLU-CORTEF) injection 100 mg       Admin Date  05/22/2024  17:13 Action  Given Dose  100 mg Route  IntraMUSCular Site  Deltoid Left Administered By  Traci Prieto CMA    Ordering Provider: Chris Clemente APRN - CNP    Patient Supplied?: No    Comments: SITE: LEFT DELTOID  LOT#: [id number]  NDC#: 7295-5224-87  EXP: 9/30/26  VERIFIED BY: DEE              methylPREDNISolone acetate (DEPO-MEDROL) injection 80 mg       Admin Date  05/22/2024  17:14 Action  Given Dose  80 mg Route  IntraMUSCular Site  Deltoid Right Administered By  Traci Prieto CMA    Ordering Provider: Chris Clemente APRN - CNP    Patient Supplied?: No    Comments: SITE:  RIGHT DELTOID  NDC#  [address]-9  LOT#  [id number]  EXP DATE:  2/28/26  VERIFIED BY: DEE

## 2024-05-23 ENCOUNTER — TELEPHONE (OUTPATIENT)
Dept: FAMILY MEDICINE CLINIC | Age: 64
End: 2024-05-23

## 2024-08-02 LAB
CHOLESTEROL, TOTAL: 88 MG/DL
CHOLESTEROL/HDL RATIO: ABNORMAL
HDLC SERPL-MCNC: 28 MG/DL (ref 35–70)
LDL CHOLESTEROL: 41
NONHDLC SERPL-MCNC: 59 MG/DL
TRIGL SERPL-MCNC: 90 MG/DL
VLDLC SERPL CALC-MCNC: ABNORMAL MG/DL

## 2025-04-14 LAB
BASOPHILS ABSOLUTE: ABNORMAL
BASOPHILS RELATIVE PERCENT: ABNORMAL
BUN BLDV-MCNC: 18 MG/DL
CALCIUM SERPL-MCNC: 8.3 MG/DL
CHLORIDE BLD-SCNC: 101 MMOL/L
CO2: 24 MMOL/L
CREAT SERPL-MCNC: 1.2 MG/DL
EGFR: 67
EOSINOPHILS ABSOLUTE: ABNORMAL
EOSINOPHILS RELATIVE PERCENT: ABNORMAL
ESTIMATED AVERAGE GLUCOSE: NORMAL
GLUCOSE BLD-MCNC: 89 MG/DL
HBA1C MFR BLD: 5.8 %
HCT VFR BLD CALC: 38.7 % (ref 41–53)
HEMOGLOBIN: 12.9 G/DL (ref 13.5–17.5)
LYMPHOCYTES ABSOLUTE: ABNORMAL
LYMPHOCYTES RELATIVE PERCENT: ABNORMAL
MCH RBC QN AUTO: 30.9 PG
MCHC RBC AUTO-ENTMCNC: 33.3 G/DL
MCV RBC AUTO: 92.6 FL
MONOCYTES ABSOLUTE: ABNORMAL
MONOCYTES RELATIVE PERCENT: ABNORMAL
NEUTROPHILS ABSOLUTE: ABNORMAL
NEUTROPHILS RELATIVE PERCENT: ABNORMAL
PLATELET # BLD: 213 K/ΜL
PMV BLD AUTO: 10 FL
POTASSIUM SERPL-SCNC: 4.1 MMOL/L
RBC # BLD: 4.18 10^6/ΜL
SODIUM BLD-SCNC: 135 MMOL/L
WBC # BLD: 6.66 10^3/ML

## 2025-04-17 ENCOUNTER — OFFICE VISIT (OUTPATIENT)
Dept: FAMILY MEDICINE CLINIC | Age: 65
End: 2025-04-17

## 2025-04-17 VITALS
BODY MASS INDEX: 25.05 KG/M2 | TEMPERATURE: 98.1 F | RESPIRATION RATE: 18 BRPM | SYSTOLIC BLOOD PRESSURE: 120 MMHG | HEART RATE: 70 BPM | WEIGHT: 175 LBS | HEIGHT: 70 IN | OXYGEN SATURATION: 98 % | DIASTOLIC BLOOD PRESSURE: 70 MMHG

## 2025-04-17 DIAGNOSIS — I50.42 CHRONIC HEART FAILURE WITH REDUCED EJECTION FRACTION AND DIASTOLIC DYSFUNCTION (HCC): ICD-10-CM

## 2025-04-17 DIAGNOSIS — Z12.11 SCREENING FOR COLON CANCER: ICD-10-CM

## 2025-04-17 DIAGNOSIS — K22.719 BARRETT'S ESOPHAGUS WITH DYSPLASIA: ICD-10-CM

## 2025-04-17 DIAGNOSIS — M16.12 PRIMARY OSTEOARTHRITIS OF LEFT HIP: Primary | ICD-10-CM

## 2025-04-17 DIAGNOSIS — Z01.818 PREOP EXAMINATION: ICD-10-CM

## 2025-04-17 DIAGNOSIS — I48.0 PAROXYSMAL ATRIAL FIBRILLATION (HCC): ICD-10-CM

## 2025-04-17 PROBLEM — R57.0 CARDIOGENIC SHOCK (HCC): Status: RESOLVED | Noted: 2021-03-08 | Resolved: 2025-04-17

## 2025-04-17 PROBLEM — R57.0 CARDIOGENIC SHOCK (HCC): Status: ACTIVE | Noted: 2021-03-08

## 2025-04-17 SDOH — ECONOMIC STABILITY: FOOD INSECURITY: WITHIN THE PAST 12 MONTHS, YOU WORRIED THAT YOUR FOOD WOULD RUN OUT BEFORE YOU GOT MONEY TO BUY MORE.: NEVER TRUE

## 2025-04-17 SDOH — ECONOMIC STABILITY: FOOD INSECURITY: WITHIN THE PAST 12 MONTHS, THE FOOD YOU BOUGHT JUST DIDN'T LAST AND YOU DIDN'T HAVE MONEY TO GET MORE.: NEVER TRUE

## 2025-04-17 ASSESSMENT — PATIENT HEALTH QUESTIONNAIRE - PHQ9
1. LITTLE INTEREST OR PLEASURE IN DOING THINGS: NOT AT ALL
SUM OF ALL RESPONSES TO PHQ QUESTIONS 1-9: 0
SUM OF ALL RESPONSES TO PHQ QUESTIONS 1-9: 0
2. FEELING DOWN, DEPRESSED OR HOPELESS: NOT AT ALL
SUM OF ALL RESPONSES TO PHQ QUESTIONS 1-9: 0
SUM OF ALL RESPONSES TO PHQ QUESTIONS 1-9: 0

## 2025-04-17 NOTE — PROGRESS NOTES
Preoperative Consultation      Arnol Liu  YOB: 1960    Date of Service:  4/17/2025    Vitals:    04/17/25 1501   BP: 120/70   BP Site: Right Upper Arm   Patient Position: Sitting   BP Cuff Size: Small Adult   Pulse: 70   Resp: 18   Temp: 98.1 °F (36.7 °C)   TempSrc: Oral   SpO2: 98%   Weight: 79.4 kg (175 lb)   Height: 1.778 m (5' 10\")      Wt Readings from Last 2 Encounters:   04/17/25 79.4 kg (175 lb)   05/22/24 77.1 kg (170 lb)     BP Readings from Last 3 Encounters:   04/17/25 120/70   08/19/22 118/76   12/09/21 118/74        Chief Complaint   Patient presents with    Pre-op Exam     Hip Replacement Surgery on   4/25/25   Dr Otilio Jerome at [facility]        No Known Allergies  Outpatient Medications Marked as Taking for the 4/17/25 encounter (Office Visit) with Chris Clemente APRN - CNP   Medication Sig Dispense Refill    dapagliflozin (FARXIGA) 10 MG tablet Take 1 tablet by mouth every morning      metoprolol succinate (TOPROL XL) 25 MG extended release tablet Take 1 tablet by mouth daily      ENTRESTO 24-26 MG per tablet       sildenafil (VIAGRA) 100 MG tablet TAKE 1 TABLET BY MOUTH AS NEEDED FOR ERECTILE DYSFUNCTION **NOT COVERED BY INSURANCE      apixaban (ELIQUIS) 5 MG TABS tablet Take 1 tablet by mouth 2 times daily      atorvastatin (LIPITOR) 80 MG tablet          This patient presents to the office today for a preoperative consultation at the request of surgeon, Dr. Jerome, who plans on performing Total Left Hip Arthroplasty on April 25 at [facility]  The current problem began multiple years ago, and symptoms have been worsening with time within the past 6 months.  Conservative therapy: Yes: Injections/PT, which has been ineffective.    Planned anesthesia: General   Known anesthesia problems: None   Bleeding risk: Known coagulopathy- On eliquis. To discontinue based upon cardiology recommendations  Personal or FH of DVT/PE: No    Patient objection to receiving blood

## 2025-04-17 NOTE — ASSESSMENT & PLAN NOTE
Controlled.  Follows with cardiology for management.  Continue metoprolol, Farxiga, and Entresto.  No changes today.

## 2025-04-17 NOTE — ASSESSMENT & PLAN NOTE
Upon chart review, patient had EGD in 2022 with diagnosis of Hunter's esophagus.  Unable to view pathology results.  Patient never followed up with gastroenterology outside the hospital.  Emphasized the importance of this.  Not on PPI at this time.  Placing referral to Dr. Mayes with [facility] who performed EGD at that time to follow-up.  Also encouraged to schedule screening colonoscopy.  Educated that he can do EGD and colonoscopy at same time.    Orders:    Phillip De Oliveira DO, Gastroenterology (ERCP & EUS), [facility]

## 2025-04-17 NOTE — ASSESSMENT & PLAN NOTE
Controlled.  Follows with cardiology for management.  Continue Eliquis and metoprolol.  Hold Eliquis as recommended by cardiology.

## 2025-04-17 NOTE — ASSESSMENT & PLAN NOTE
Cleared for surgery once cleared by cardiology.  Has stress test tomorrow which was recommended by cardiology prior to clearance.

## 2025-08-01 ENCOUNTER — OFFICE VISIT (OUTPATIENT)
Dept: FAMILY MEDICINE CLINIC | Age: 65
End: 2025-08-01

## 2025-08-01 VITALS
HEIGHT: 69 IN | BODY MASS INDEX: 25.98 KG/M2 | DIASTOLIC BLOOD PRESSURE: 60 MMHG | HEART RATE: 64 BPM | WEIGHT: 175.4 LBS | SYSTOLIC BLOOD PRESSURE: 112 MMHG | OXYGEN SATURATION: 96 %

## 2025-08-01 DIAGNOSIS — L23.7 POISON IVY DERMATITIS: ICD-10-CM

## 2025-08-01 DIAGNOSIS — Z87.891 PERSONAL HISTORY OF TOBACCO USE: ICD-10-CM

## 2025-08-01 DIAGNOSIS — Z00.00 INITIAL MEDICARE ANNUAL WELLNESS VISIT: Primary | ICD-10-CM

## 2025-08-01 RX ORDER — METHYLPREDNISOLONE ACETATE 80 MG/ML
80 INJECTION, SUSPENSION INTRA-ARTICULAR; INTRALESIONAL; INTRAMUSCULAR; SOFT TISSUE ONCE
Status: SHIPPED | OUTPATIENT
Start: 2025-08-01

## 2025-08-01 RX ORDER — HYDROCORTISONE SODIUM SUCCINATE 100 MG/2ML
100 INJECTION INTRAMUSCULAR; INTRAVENOUS ONCE
Status: COMPLETED | OUTPATIENT
Start: 2025-08-01 | End: 2025-08-01

## 2025-08-01 RX ORDER — HYDROCORTISONE SODIUM SUCCINATE 100 MG/2ML
100 INJECTION INTRAMUSCULAR; INTRAVENOUS ONCE
Status: SHIPPED | OUTPATIENT
Start: 2025-08-01

## 2025-08-01 RX ORDER — METHYLPREDNISOLONE ACETATE 80 MG/ML
80 INJECTION, SUSPENSION INTRA-ARTICULAR; INTRALESIONAL; INTRAMUSCULAR; SOFT TISSUE ONCE
Status: COMPLETED | OUTPATIENT
Start: 2025-08-01 | End: 2025-08-01

## 2025-08-01 RX ADMIN — METHYLPREDNISOLONE ACETATE 80 MG: 80 INJECTION, SUSPENSION INTRA-ARTICULAR; INTRALESIONAL; INTRAMUSCULAR; SOFT TISSUE at 17:06

## 2025-08-01 RX ADMIN — HYDROCORTISONE SODIUM SUCCINATE 100 MG: 100 INJECTION INTRAMUSCULAR; INTRAVENOUS at 17:01

## 2025-08-01 ASSESSMENT — PATIENT HEALTH QUESTIONNAIRE - PHQ9
2. FEELING DOWN, DEPRESSED OR HOPELESS: NOT AT ALL
SUM OF ALL RESPONSES TO PHQ QUESTIONS 1-9: 0
1. LITTLE INTEREST OR PLEASURE IN DOING THINGS: NOT AT ALL
SUM OF ALL RESPONSES TO PHQ QUESTIONS 1-9: 0

## 2025-08-01 ASSESSMENT — LIFESTYLE VARIABLES
HOW MANY STANDARD DRINKS CONTAINING ALCOHOL DO YOU HAVE ON A TYPICAL DAY: 1 OR 2
HOW OFTEN DO YOU HAVE A DRINK CONTAINING ALCOHOL: 2-4 TIMES A MONTH

## 2025-08-01 NOTE — PATIENT INSTRUCTIONS
You may receive a survey regarding the care you received during your visit.  Your input is valuable to us.  We encourage you to complete and return your survey.  We hope you will choose us in the future for your healthcare needs.        Learning About Dental Care for Older Adults  Dental care for older adults: Overview  Dental care for older people is much the same as for younger adults. But older adults do have concerns that younger adults do not. Older adults may have problems with gum disease and decay on the roots of their teeth. They may need missing teeth replaced or broken fillings fixed. Or they may have dentures that need to be cared for. Some older adults may have trouble holding a toothbrush.  You can help remind the person you are caring for to brush and floss their teeth or to clean their dentures. In some cases, you may need to do the brushing and other dental care tasks. People who have trouble using their hands or who have dementia may need this extra help.  How can you help with dental care?  Normal dental care  To keep the teeth and gums healthy:  Brush the teeth with fluoride toothpaste twice a day--in the morning and at night--and floss at least once a day. Plaque can quickly build up on the teeth of older adults.  Watch for the signs of gum disease. These signs include gums that bleed after brushing or after eating hard foods, such as apples.  See a dentist regularly. Many experts recommend checkups every 6 months.  Keep the dentist up to date on any new medications the person is taking.  Encourage a balanced diet that includes whole grains, vegetables, and fruits, and that is low in saturated fat and sodium.  Encourage the person you're caring for not to use tobacco products. They can affect dental and general health.  Many older adults have a fixed income and feel that they can't afford dental care. But most [facility] and [facility] have programs in which dentists help older adults by lowering fees.

## 2025-08-01 NOTE — PROGRESS NOTES
Medicare Annual Wellness Visit    Arnol Liu is here for Medicare AWV (Medicare awv) and Poison Ivy    Assessment & Plan  Initial Medicare annual wellness visit  - Healthcare topics addressed as below  -Get fasting labs via cardiology  -In process of getting EGD and colonoscopy scheduled  -CT lung screen ordered  -Encouraged to get vaccines at local pharmacy  -Steroid injection for poison ivy  -Continue to follow with Ortho  -Follow-up annually, or sooner if needed         Poison ivy dermatitis   The patient has extensive poison ivy involving his arms, legs, and torso.  Benefit from steroid injection in the past.  80 mg Depo-Medrol and 100 mg Solu-Cortef administered in office today.  Follow-up as needed.    Orders:    methylPREDNISolone acetate (DEPO-MEDROL) injection 80 mg    hydrocortisone sodium succinate PF (SOLU-CORTEF) injection 100 mg    methylPREDNISolone acetate (DEPO-MEDROL) injection 80 mg    hydrocortisone sodium succinate PF (SOLU-CORTEF) injection 100 mg    Personal history of tobacco use   Quit smoking in 2011.  CT lung screening ordered.    Orders:    OK VISIT TO DISCUSS LUNG CA SCREEN W LDCT    CT Lung Screen (Initial/Annual/Baseline); Future                Return in 1 year (on 8/1/2026) for Medicare AWV.     Subjective     History of Present Illness  The patient is a 65-year-old male who presents for an annual wellness visit.    He reports a successful hip surgery with no current pain.  Completed therapy last week.  Had postop exam a few weeks ago.  Did have some issues with bruising/hematoma which has not completely gone away, but no intervention at this time.    The patient did reach out to GI regarding EGD and colonoscopy.  They are currently trying to reach out with his cardiologist to get clearance so that he can have this performed.    Agreeable to CT lung screening order.  Quit smoking in 2011.    He has poison ivy and was hoping to get injection.  Had an episode of poison ivy last year

## 2025-08-01 NOTE — PROGRESS NOTES
Administrations This Visit       hydrocortisone sodium succinate PF (SOLU-CORTEF) injection 100 mg       Admin Date  08/01/2025  17:01 Action  Given Dose  100 mg Route  IntraMUSCular Site  Deltoid Right Documented By  Traci Prieto CMA    NDC: 5144-6155-06    Lot#: [id number]    : [employer] [place]    Patient Supplied?: No    Comments: SITE: RIGHT DELTOID  LOT#:[id number]  NDC#: 8321-0089-66  EXP: 6/30/27  VERIFIED BY: MATEO              methylPREDNISolone acetate (DEPO-MEDROL) injection 80 mg       Admin Date  08/01/2025  17:06 Action  Given Dose  80 mg Route  IntraMUSCular Site  Deltoid Left Documented By  Traci Prieto CMA    NDC: [address]-4    Lot#: [id number]    : [employer]    Patient Supplied?: No    Comments: SITE: LEFT DELTOID  NDC#  [address]-7  LOT#  [id number]  EXP DATE:  4/30/27  VERIFIED BY: MATEO

## 2025-08-13 ENCOUNTER — OFFICE VISIT (OUTPATIENT)
Age: 65
End: 2025-08-13

## 2025-08-13 VITALS
WEIGHT: 172 LBS | HEART RATE: 65 BPM | HEIGHT: 70 IN | TEMPERATURE: 98 F | OXYGEN SATURATION: 96 % | DIASTOLIC BLOOD PRESSURE: 74 MMHG | BODY MASS INDEX: 24.62 KG/M2 | SYSTOLIC BLOOD PRESSURE: 134 MMHG

## 2025-08-13 DIAGNOSIS — I10 ELEVATED BLOOD PRESSURE READING IN OFFICE WITH DIAGNOSIS OF HYPERTENSION: ICD-10-CM

## 2025-08-13 DIAGNOSIS — L23.7 POISON IVY DERMATITIS: Primary | ICD-10-CM

## 2025-08-13 RX ORDER — DEXAMETHASONE SODIUM PHOSPHATE 10 MG/ML
10 INJECTION, SOLUTION INTRA-ARTICULAR; INTRALESIONAL; INTRAMUSCULAR; INTRAVENOUS; SOFT TISSUE ONCE
Status: COMPLETED | OUTPATIENT
Start: 2025-08-13 | End: 2025-08-13

## 2025-08-13 RX ORDER — HYDROXYZINE HYDROCHLORIDE 25 MG/1
25 TABLET, FILM COATED ORAL
Qty: 8 TABLET | Refills: 0 | Status: SHIPPED | OUTPATIENT
Start: 2025-08-13 | End: 2025-08-23

## 2025-08-13 RX ADMIN — DEXAMETHASONE SODIUM PHOSPHATE 10 MG: 10 INJECTION, SOLUTION INTRA-ARTICULAR; INTRALESIONAL; INTRAMUSCULAR; INTRAVENOUS; SOFT TISSUE at 11:36
